# Patient Record
Sex: FEMALE | Race: WHITE | HISPANIC OR LATINO | Employment: UNEMPLOYED | URBAN - METROPOLITAN AREA
[De-identification: names, ages, dates, MRNs, and addresses within clinical notes are randomized per-mention and may not be internally consistent; named-entity substitution may affect disease eponyms.]

---

## 2019-12-27 PROCEDURE — 99284 EMERGENCY DEPT VISIT MOD MDM: CPT | Mod: 25

## 2019-12-27 PROCEDURE — 93010 ELECTROCARDIOGRAM REPORT: CPT | Mod: ,,, | Performed by: INTERNAL MEDICINE

## 2019-12-27 PROCEDURE — 96361 HYDRATE IV INFUSION ADD-ON: CPT

## 2019-12-27 PROCEDURE — 96374 THER/PROPH/DIAG INJ IV PUSH: CPT

## 2019-12-27 PROCEDURE — 93005 ELECTROCARDIOGRAM TRACING: CPT

## 2019-12-27 PROCEDURE — 99285 EMERGENCY DEPT VISIT HI MDM: CPT | Mod: ,,, | Performed by: EMERGENCY MEDICINE

## 2019-12-27 PROCEDURE — 93010 EKG 12-LEAD: ICD-10-PCS | Mod: ,,, | Performed by: INTERNAL MEDICINE

## 2019-12-27 PROCEDURE — 99285 PR EMERGENCY DEPT VISIT,LEVEL V: ICD-10-PCS | Mod: ,,, | Performed by: EMERGENCY MEDICINE

## 2019-12-28 ENCOUNTER — HOSPITAL ENCOUNTER (EMERGENCY)
Facility: HOSPITAL | Age: 29
Discharge: HOME | End: 2019-12-28
Attending: EMERGENCY MEDICINE

## 2019-12-28 VITALS
OXYGEN SATURATION: 100 % | BODY MASS INDEX: 21.26 KG/M2 | RESPIRATION RATE: 18 BRPM | WEIGHT: 120 LBS | SYSTOLIC BLOOD PRESSURE: 122 MMHG | DIASTOLIC BLOOD PRESSURE: 59 MMHG | HEIGHT: 63 IN | TEMPERATURE: 98 F | HEART RATE: 95 BPM

## 2019-12-28 DIAGNOSIS — F41.9 ANXIETY: Primary | ICD-10-CM

## 2019-12-28 DIAGNOSIS — G47.9 INABILITY TO SLEEP: ICD-10-CM

## 2019-12-28 DIAGNOSIS — R07.9 CHEST PAIN: ICD-10-CM

## 2019-12-28 LAB
ALBUMIN SERPL BCP-MCNC: 4.9 G/DL (ref 3.5–5.2)
ALP SERPL-CCNC: 81 U/L (ref 55–135)
ALT SERPL W/O P-5'-P-CCNC: 23 U/L (ref 10–44)
AMPHET+METHAMPHET UR QL: NEGATIVE
ANION GAP SERPL CALC-SCNC: 13 MMOL/L (ref 8–16)
AST SERPL-CCNC: 28 U/L (ref 10–40)
B-HCG UR QL: NEGATIVE
BARBITURATES UR QL SCN>200 NG/ML: NEGATIVE
BASOPHILS # BLD AUTO: 0.08 K/UL (ref 0–0.2)
BASOPHILS NFR BLD: 0.7 % (ref 0–1.9)
BENZODIAZ UR QL SCN>200 NG/ML: NEGATIVE
BILIRUB SERPL-MCNC: 0.9 MG/DL (ref 0.1–1)
BUN SERPL-MCNC: 4 MG/DL (ref 6–20)
BZE UR QL SCN: NEGATIVE
CALCIUM SERPL-MCNC: 10.1 MG/DL (ref 8.7–10.5)
CANNABINOIDS UR QL SCN: NORMAL
CHLORIDE SERPL-SCNC: 105 MMOL/L (ref 95–110)
CO2 SERPL-SCNC: 20 MMOL/L (ref 23–29)
CREAT SERPL-MCNC: 0.8 MG/DL (ref 0.5–1.4)
CREAT UR-MCNC: 190 MG/DL (ref 15–325)
CTP QC/QA: YES
DIFFERENTIAL METHOD: ABNORMAL
EOSINOPHIL # BLD AUTO: 0 K/UL (ref 0–0.5)
EOSINOPHIL NFR BLD: 0.1 % (ref 0–8)
ERYTHROCYTE [DISTWIDTH] IN BLOOD BY AUTOMATED COUNT: 11.9 % (ref 11.5–14.5)
EST. GFR  (AFRICAN AMERICAN): >60 ML/MIN/1.73 M^2
EST. GFR  (NON AFRICAN AMERICAN): >60 ML/MIN/1.73 M^2
GLUCOSE SERPL-MCNC: 121 MG/DL (ref 70–110)
HCT VFR BLD AUTO: 41.1 % (ref 37–48.5)
HGB BLD-MCNC: 14 G/DL (ref 12–16)
IMM GRANULOCYTES # BLD AUTO: 0.03 K/UL (ref 0–0.04)
IMM GRANULOCYTES NFR BLD AUTO: 0.2 % (ref 0–0.5)
LYMPHOCYTES # BLD AUTO: 2.2 K/UL (ref 1–4.8)
LYMPHOCYTES NFR BLD: 17.9 % (ref 18–48)
MCH RBC QN AUTO: 31.5 PG (ref 27–31)
MCHC RBC AUTO-ENTMCNC: 34.1 G/DL (ref 32–36)
MCV RBC AUTO: 92 FL (ref 82–98)
METHADONE UR QL SCN>300 NG/ML: NEGATIVE
MONOCYTES # BLD AUTO: 1.1 K/UL (ref 0.3–1)
MONOCYTES NFR BLD: 9.2 % (ref 4–15)
NEUTROPHILS # BLD AUTO: 8.7 K/UL (ref 1.8–7.7)
NEUTROPHILS NFR BLD: 71.9 % (ref 38–73)
NRBC BLD-RTO: 0 /100 WBC
OPIATES UR QL SCN: NEGATIVE
PCP UR QL SCN>25 NG/ML: NEGATIVE
PLATELET # BLD AUTO: 211 K/UL (ref 150–350)
PMV BLD AUTO: 13.2 FL (ref 9.2–12.9)
POTASSIUM SERPL-SCNC: 4.1 MMOL/L (ref 3.5–5.1)
PROT SERPL-MCNC: 8.2 G/DL (ref 6–8.4)
RBC # BLD AUTO: 4.45 M/UL (ref 4–5.4)
SODIUM SERPL-SCNC: 138 MMOL/L (ref 136–145)
TOXICOLOGY INFORMATION: NORMAL
WBC # BLD AUTO: 12.09 K/UL (ref 3.9–12.7)

## 2019-12-28 PROCEDURE — 85025 COMPLETE CBC W/AUTO DIFF WBC: CPT

## 2019-12-28 PROCEDURE — 81025 URINE PREGNANCY TEST: CPT | Performed by: EMERGENCY MEDICINE

## 2019-12-28 PROCEDURE — 80307 DRUG TEST PRSMV CHEM ANLYZR: CPT

## 2019-12-28 PROCEDURE — 63600175 PHARM REV CODE 636 W HCPCS: Performed by: EMERGENCY MEDICINE

## 2019-12-28 PROCEDURE — 80053 COMPREHEN METABOLIC PANEL: CPT

## 2019-12-28 RX ORDER — LORAZEPAM 2 MG/ML
0.5 INJECTION INTRAMUSCULAR
Status: COMPLETED | OUTPATIENT
Start: 2019-12-28 | End: 2019-12-28

## 2019-12-28 RX ADMIN — LORAZEPAM 0.5 MG: 2 INJECTION INTRAMUSCULAR; INTRAVENOUS at 01:12

## 2019-12-28 RX ADMIN — SODIUM CHLORIDE 1000 ML: 0.9 INJECTION, SOLUTION INTRAVENOUS at 04:12

## 2019-12-28 RX ADMIN — SODIUM CHLORIDE 1000 ML: 0.9 INJECTION, SOLUTION INTRAVENOUS at 01:12

## 2019-12-28 NOTE — DISCHARGE INSTRUCTIONS
Please follow-up outpatient within 1-2 weeks for re-evaluation.    If you develop any worsening pain, feel like going to herself, or hearing voices feel like you want hurt yourself feel like you have anxiety or depression that is out of control or for any other new, worsening or worrisome symptoms please return to the emergency department immediately.

## 2019-12-28 NOTE — ED NOTES
Pt came in as family concerned that she hasn't slept in a couple of days.  Pt denies lack of sleep.  Denies any chest pain or discomfort.

## 2019-12-29 NOTE — ED PROVIDER NOTES
"Encounter Date: 2019       History     Chief Complaint   Patient presents with    Panic Attack     family pt with "a lot of panic attacks."  has not slept in 3 days.  reports chest pain, vomiting. pt diaphoretic in triage.  denies SI, HI, hallucinations     HPI   29y F with who denies PMH comes in brought in by family for concern that she is not sleeping. Family also states that she is complaining of chest pain intermittently, they are worried she is having a panic attack. When patient is asked, she denies all these statements and states she feels fine and is able to sleep. She denies CP, SOB, dizziness, abdo pain. She denies being depressed.  Family states she stay up and cleans the house instead of sleeping.   She denies SI/HI/AH/VH. She denies drug use.   Family denies pt has had any suicidal gestures or comments.     Pt denies being abused or feeling in danger (ask separately without family present)  No Numbness, tingling, weakness, changes in vision, falls or trauma.   No cough, URI symptoms.     Review of patient's allergies indicates:   Allergen Reactions    Dilaudid [hydromorphone (bulk)]      Pt states she gets itchy everywhere     No past medical history on file.  Past Surgical History:   Procedure Laterality Date     SECTION, CLASSIC      GALLBLADDER SURGERY       Family History   Problem Relation Age of Onset    Cancer Neg Hx     Diabetes Neg Hx     Heart disease Neg Hx     Hypertension Neg Hx     Stroke Neg Hx      Social History     Tobacco Use    Smoking status: Current Some Day Smoker     Packs/day: 0.10     Years: 4.00     Pack years: 0.40    Smokeless tobacco: Never Used   Substance Use Topics    Alcohol use: Yes    Drug use: No     Review of Systems   Constitutional:  No Fever   Eyes: No Vision Changes  ENT/Mouth: No sore throat, No rhinorrhea  Cardiovascular:  No Chest Pain, No Palpitations  Respiratory:  No Cough, No SOB  Gastrointestinal:  No Nausea, No Vomiting, No " Diarrhea, No abdo pain.  Genitourinary:  No  pain, No dysuria   Musculoskeletal:  No Back Pain, No Neck Pain, No recent trauma.  Skin:  No skin Lesions  Neuro:  No Weakness, No Numbness, No Dizziness, No Headache        Physical Exam     Initial Vitals [12/27/19 2348]   BP Pulse Resp Temp SpO2   (!) 129/90 (!) 122 (!) 24 98.8 °F (37.1 °C) 99 %      MAP       --         Physical Exam   Physical Exam:  CONSTITUTIONAL: Well developed, well nourished, in no acute distress.  HENT: Normocephalic, atraumatic    EYES: Sclerae anicteric, pupils equal round reactive, EOMI, no nystagmus.  NECK: Supple, no thyroid enlargement  CARDIOVASCULAR: Tachycardic and regular, without any murmurs, gallops, rubs. No LE swelling or TTP.   RESPIRATORY: Speaking in full sentences. Breathing comfortably. Auscultation of the lungs revealed normal breath sounds b/l, no wheezing, no rales, no rhonchi.  ABDOMEN: Soft and nontender, no masses, no rebound or guarding   NEUROLOGIC: Alert, interacting normally. No facial droop. Normal FTN b/l, 5/5 strength b/l to upper and lower extremities.    MSK: Moving all four extremities.  Skin: There are excoriations to the upper chest, Warm and dry. Otherwise, no visible rash on exposed areas of skin.    Psych: Affect is flat, pt is intermittently no answering questions, kind of zones out, but is tracking this MD during these moments. No seizure like activity.       ED Course   Procedures  Labs Reviewed   CBC W/ AUTO DIFFERENTIAL - Abnormal; Notable for the following components:       Result Value    Mean Corpuscular Hemoglobin 31.5 (*)     MPV 13.2 (*)     Gran # (ANC) 8.7 (*)     Mono # 1.1 (*)     Lymph% 17.9 (*)     All other components within normal limits   COMPREHENSIVE METABOLIC PANEL - Abnormal; Notable for the following components:    CO2 20 (*)     Glucose 121 (*)     BUN, Bld 4 (*)     All other components within normal limits   DRUG SCREEN PANEL, URINE EMERGENCY   POCT URINE PREGNANCY     EKG  Readings: (Independently Interpreted)   Sinus tachycardia at 114, no ischemic changes.      ECG Results          EKG 12-lead (Final result)  Result time 12/28/19 11:10:45    Final result by Interface, Lab In [facility] (12/28/19 11:10:45)                 Narrative:    Test Reason : R07.9,    Vent. Rate : 114 BPM     Atrial Rate : 114 BPM     P-R Int : 126 ms          QRS Dur : 082 ms      QT Int : 322 ms       P-R-T Axes : 084 088 078 degrees     QTc Int : 443 ms    Sinus tachycardia  Otherwise normal ECG  No previous ECGs available  Confirmed by Stef Payton MD ([id number]) on 12/28/2019 11:10:38 AM    Referred By: [email or web address]   SELF           Confirmed By:Stef Payton MD                            Imaging Results    None          Medical Decision Making:   Initial Assessment:   29y F with no sig PMH comes in brought by family for insomnia for 3 days and reported chest discomfort, family concerned pt is having panic attacks.  Exam is non focal, EKG is as noted. Lung are clear and, other than tachycardia,  there is no evidence on exam of cardiopulmonary disease. Abdomen is benign. Neuro exam is non-focal    Tachy, but otherwise no PE risk factors. Not consistent with ACS.     Affect is flat and demeanor of patient points to likely psych vs substance induced disturbance.    Pt denies abuse.   Is not psychotic, and despite above, is linear and appropriate.     Basic labs undertaken are neg. UPT neg, Utox + for THC.     CT brain considered, however pt is not altered, just has flat affect and avoidant demeanor. Given benign neuro exam, not consistent with dangerous IC pathology. Risk of CT outweigh benefits.    Given IVF to improve HR. Given very small dose of Ativan for anxiolysis to see if this helps relax patient. Pt was able to sleep for several hours in the emergency department. HR normalized, exam continued to be benign.    Recommended PO benadry for further sleep aid and outpatient follow up. Informed pt and family that  she may need psych follow up if she continues to have similar symptoms. ED return instructions.     Findings of ED work up explained to patient. Patient agrees with discharge plan and verbalizes understanding of return precautions.     MDM Complexity Points:   Problem Points:  1.New problem, with no additional ED work-up planned (maximum of 1) - insomnia, chest pain, report anxiety.    Data Points:  Review or order clinical lab tests, Review or order medicine test (PFTs, EKG, cardiac echo or catheterization), Independent review of image, tracing, or specimen, Decision to obtain old records (in the EHR) and Obtaining history from Someone else other than the patient. - part of hx obtained from family.                                        Clinical Impression:       ICD-10-CM ICD-9-CM   1. Anxiety F41.9 300.00   2. Chest pain R07.9 786.50   3. Inability to sleep G47.9 780.50                             Hitesh Glass MD  12/28/19 1925       Hitesh Glass MD  12/28/19 1925

## 2020-01-01 ENCOUNTER — HOSPITAL ENCOUNTER (EMERGENCY)
Facility: HOSPITAL | Age: 30
Discharge: HOME | End: 2020-01-01
Attending: EMERGENCY MEDICINE

## 2020-01-01 VITALS
BODY MASS INDEX: 20.24 KG/M2 | RESPIRATION RATE: 18 BRPM | HEART RATE: 93 BPM | HEIGHT: 62 IN | OXYGEN SATURATION: 99 % | SYSTOLIC BLOOD PRESSURE: 146 MMHG | DIASTOLIC BLOOD PRESSURE: 82 MMHG | WEIGHT: 110 LBS | TEMPERATURE: 98 F

## 2020-01-01 DIAGNOSIS — R68.89 MULTIPLE COMPLAINTS: ICD-10-CM

## 2020-01-01 DIAGNOSIS — F41.9 ANXIETY: Primary | ICD-10-CM

## 2020-01-01 LAB
B-HCG UR QL: NEGATIVE
CTP QC/QA: YES

## 2020-01-01 PROCEDURE — 99283 EMERGENCY DEPT VISIT LOW MDM: CPT | Mod: 25

## 2020-01-01 PROCEDURE — 93010 EKG 12-LEAD: ICD-10-PCS | Mod: ,,, | Performed by: INTERNAL MEDICINE

## 2020-01-01 PROCEDURE — 93005 ELECTROCARDIOGRAM TRACING: CPT

## 2020-01-01 PROCEDURE — 93010 ELECTROCARDIOGRAM REPORT: CPT | Mod: ,,, | Performed by: INTERNAL MEDICINE

## 2020-01-01 PROCEDURE — 81025 URINE PREGNANCY TEST: CPT | Performed by: PHYSICIAN ASSISTANT

## 2020-01-01 PROCEDURE — 99285 PR EMERGENCY DEPT VISIT,LEVEL V: ICD-10-PCS | Mod: ,,, | Performed by: PHYSICIAN ASSISTANT

## 2020-01-01 PROCEDURE — 99285 EMERGENCY DEPT VISIT HI MDM: CPT | Mod: ,,, | Performed by: PHYSICIAN ASSISTANT

## 2020-01-01 PROCEDURE — 25000003 PHARM REV CODE 250: Performed by: PHYSICIAN ASSISTANT

## 2020-01-01 RX ORDER — HYDROXYZINE PAMOATE 25 MG/1
50 CAPSULE ORAL
Status: COMPLETED | OUTPATIENT
Start: 2020-01-01 | End: 2020-01-01

## 2020-01-01 RX ORDER — HYDROXYZINE HYDROCHLORIDE 25 MG/1
25 TABLET, FILM COATED ORAL 3 TIMES DAILY PRN
Qty: 30 TABLET | Refills: 0 | Status: SHIPPED | OUTPATIENT
Start: 2020-01-01 | End: 2020-06-08

## 2020-01-01 RX ORDER — IBUPROFEN 600 MG/1
600 TABLET ORAL
Status: COMPLETED | OUTPATIENT
Start: 2020-01-01 | End: 2020-01-01

## 2020-01-01 RX ADMIN — HYDROXYZINE PAMOATE 50 MG: 25 CAPSULE ORAL at 05:01

## 2020-01-01 RX ADMIN — IBUPROFEN 600 MG: 600 TABLET, FILM COATED ORAL at 05:01

## 2020-01-01 NOTE — ED NOTES
The patient came to the ER today with c/o feeling palpitations, left temporal headache. She feels as though her head gets hot and that part of her head is swollen. Sometimes she will try to sleep and her heart will start beating really fast and it wakes her up. The patient was seen recently in the er for anxiety. The patient has not been sleeping well. The patient is Azerbaijani speaking. Pt is accompanied by her cousin.

## 2020-01-01 NOTE — ED NOTES
LOC: The patient is awake, alert and aware of environment with an appropriate affect, the patient is oriented x 3.  APPEARANCE: Patient resting comfortably and in no acute distress, patient is clean and well groomed.  SKIN: The skin is warm and dry, color consistent with ethnicity, patient has normal skin turgor.  MUSKULOSKELETAL: Patient moving all extremities well, no obvious swelling or deformities noted.  RESPIRATORY: Airway is open and patent, respirations are spontaneous, patient has a normal effort and rate, no accessory muscle use noted.   CARDIAC: Patient has a normal rate and rhythm  NEUROLOGIC: Eyes open spontaneously, behavior appropriate to situation, follows commands

## 2020-01-01 NOTE — PROVIDER PROGRESS NOTES - EMERGENCY DEPT.
Encounter Date: 1/1/2020    ED Physician Progress Notes         EKG - STEMI Decision  Initial Reading: No STEMI present.

## 2020-01-02 NOTE — DISCHARGE INSTRUCTIONS
I suspect that your symptoms are due to anxiety. I am prescribing a medicine for anxiety, however best treatment for anxiety is therapy.  Please schedule an appointment with a primary care doctor or psychiatrist.  I have provided the number for [facility] where you can be seen without insurance.    If you start to have severe, worsening chest pain, shortness of breath or any new symptoms please return to the emergency department.    Sospecho que tus síntomas se deben a la ansiedad. Estoy prescribiendo un medicamento para la ansiedad, sin embargo, el mejor tratamiento para la ansiedad es la terapia. Programe keaton jannette con un médico de atención primaria o psiquiatra. He proporcionado el número de [facility] donde puede ser atendido sin seguro.    Si comienza a tener dolor de pecho severo, que empeora, falta de aliento o cualquier síntoma nuevo, regrese al departamento de emergencias.

## 2020-06-08 ENCOUNTER — LAB VISIT (OUTPATIENT)
Dept: LAB | Facility: OTHER | Age: 30
End: 2020-06-08
Attending: NURSE PRACTITIONER
Payer: MEDICAID

## 2020-06-08 ENCOUNTER — OFFICE VISIT (OUTPATIENT)
Dept: OBSTETRICS AND GYNECOLOGY | Facility: CLINIC | Age: 30
End: 2020-06-08
Payer: MEDICAID

## 2020-06-08 VITALS
BODY MASS INDEX: 23.22 KG/M2 | SYSTOLIC BLOOD PRESSURE: 98 MMHG | HEIGHT: 61 IN | WEIGHT: 123 LBS | DIASTOLIC BLOOD PRESSURE: 60 MMHG

## 2020-06-08 DIAGNOSIS — N91.2 AMENORRHEA: ICD-10-CM

## 2020-06-08 DIAGNOSIS — N91.2 AMENORRHEA: Primary | ICD-10-CM

## 2020-06-08 LAB
ABO + RH BLD: NORMAL
ANION GAP SERPL CALC-SCNC: 11 MMOL/L (ref 8–16)
B-HCG UR QL: POSITIVE
BASOPHILS # BLD AUTO: 0.04 K/UL (ref 0–0.2)
BASOPHILS NFR BLD: 0.5 % (ref 0–1.9)
BLD GP AB SCN CELLS X3 SERPL QL: NORMAL
BUN SERPL-MCNC: 6 MG/DL (ref 6–20)
CALCIUM SERPL-MCNC: 9.5 MG/DL (ref 8.7–10.5)
CHLORIDE SERPL-SCNC: 103 MMOL/L (ref 95–110)
CO2 SERPL-SCNC: 22 MMOL/L (ref 23–29)
CREAT SERPL-MCNC: 0.6 MG/DL (ref 0.5–1.4)
CTP QC/QA: YES
DIFFERENTIAL METHOD: ABNORMAL
EOSINOPHIL # BLD AUTO: 0 K/UL (ref 0–0.5)
EOSINOPHIL NFR BLD: 0.4 % (ref 0–8)
ERYTHROCYTE [DISTWIDTH] IN BLOOD BY AUTOMATED COUNT: 12.4 % (ref 11.5–14.5)
EST. GFR  (AFRICAN AMERICAN): >60 ML/MIN/1.73 M^2
EST. GFR  (NON AFRICAN AMERICAN): >60 ML/MIN/1.73 M^2
GLUCOSE SERPL-MCNC: 81 MG/DL (ref 70–110)
HCT VFR BLD AUTO: 35.4 % (ref 37–48.5)
HGB BLD-MCNC: 12 G/DL (ref 12–16)
IMM GRANULOCYTES # BLD AUTO: 0.01 K/UL (ref 0–0.04)
IMM GRANULOCYTES NFR BLD AUTO: 0.1 % (ref 0–0.5)
LYMPHOCYTES # BLD AUTO: 1.8 K/UL (ref 1–4.8)
LYMPHOCYTES NFR BLD: 23.9 % (ref 18–48)
MCH RBC QN AUTO: 30.9 PG (ref 27–31)
MCHC RBC AUTO-ENTMCNC: 33.9 G/DL (ref 32–36)
MCV RBC AUTO: 91 FL (ref 82–98)
MONOCYTES # BLD AUTO: 0.6 K/UL (ref 0.3–1)
MONOCYTES NFR BLD: 7.4 % (ref 4–15)
NEUTROPHILS # BLD AUTO: 5.2 K/UL (ref 1.8–7.7)
NEUTROPHILS NFR BLD: 67.7 % (ref 38–73)
NRBC BLD-RTO: 0 /100 WBC
PLATELET # BLD AUTO: 184 K/UL (ref 150–350)
PMV BLD AUTO: 12.6 FL (ref 9.2–12.9)
POTASSIUM SERPL-SCNC: 3.8 MMOL/L (ref 3.5–5.1)
RBC # BLD AUTO: 3.88 M/UL (ref 4–5.4)
SODIUM SERPL-SCNC: 136 MMOL/L (ref 136–145)
WBC # BLD AUTO: 7.61 K/UL (ref 3.9–12.7)

## 2020-06-08 PROCEDURE — 87624 HPV HI-RISK TYP POOLED RSLT: CPT

## 2020-06-08 PROCEDURE — 99204 OFFICE O/P NEW MOD 45 MIN: CPT | Mod: TH,S$PBB,, | Performed by: NURSE PRACTITIONER

## 2020-06-08 PROCEDURE — 86901 BLOOD TYPING SEROLOGIC RH(D): CPT

## 2020-06-08 PROCEDURE — 99999 PR PBB SHADOW E&M-EST. PATIENT-LVL III: CPT | Mod: PBBFAC,,, | Performed by: NURSE PRACTITIONER

## 2020-06-08 PROCEDURE — 87491 CHLMYD TRACH DNA AMP PROBE: CPT

## 2020-06-08 PROCEDURE — 81025 URINE PREGNANCY TEST: CPT | Mod: PBBFAC | Performed by: NURSE PRACTITIONER

## 2020-06-08 PROCEDURE — 36415 COLL VENOUS BLD VENIPUNCTURE: CPT

## 2020-06-08 PROCEDURE — 86592 SYPHILIS TEST NON-TREP QUAL: CPT

## 2020-06-08 PROCEDURE — 87086 URINE CULTURE/COLONY COUNT: CPT

## 2020-06-08 PROCEDURE — 86703 HIV-1/HIV-2 1 RESULT ANTBDY: CPT

## 2020-06-08 PROCEDURE — 99204 PR OFFICE/OUTPT VISIT, NEW, LEVL IV, 45-59 MIN: ICD-10-PCS | Mod: TH,S$PBB,, | Performed by: NURSE PRACTITIONER

## 2020-06-08 PROCEDURE — 85025 COMPLETE CBC W/AUTO DIFF WBC: CPT

## 2020-06-08 PROCEDURE — 80048 BASIC METABOLIC PNL TOTAL CA: CPT

## 2020-06-08 PROCEDURE — 86762 RUBELLA ANTIBODY: CPT

## 2020-06-08 PROCEDURE — 88175 CYTOPATH C/V AUTO FLUID REDO: CPT

## 2020-06-08 PROCEDURE — 87340 HEPATITIS B SURFACE AG IA: CPT

## 2020-06-08 PROCEDURE — 99999 PR PBB SHADOW E&M-EST. PATIENT-LVL III: ICD-10-PCS | Mod: PBBFAC,,, | Performed by: NURSE PRACTITIONER

## 2020-06-08 PROCEDURE — 99213 OFFICE O/P EST LOW 20 MIN: CPT | Mod: PBBFAC,25 | Performed by: NURSE PRACTITIONER

## 2020-06-08 PROCEDURE — 83020 HEMOGLOBIN ELECTROPHORESIS: CPT

## 2020-06-08 NOTE — PROGRESS NOTES
"CC: Positive Pregnancy Test    HISTORY OF PRESENT ILLNESS:    Sena Daniel is a 29 y.o. female, ,  Presents today for a routine exam complaining of amenorrhea and positive home urine pregnancy test.  LMP =3/3/2020  Pt reports menses were normal occuring every 30 days prior to this.  She is not currently on any contraception. She reports first baby was born at [facility]. She had a  for breech position. She denies any complications with pregnancy or delivery. She denies any medical problems. Only surgery was .     Denies nausea. Denies breast tenderness. Denies pelvic pain.    LMP: 3/3/2020  EGA: 13w6d  EDC: 2020        ROS:  GENERAL: No weight changes. No swelling. No fatigue. No fever.  CARDIOVASCULAR: No chest pain. No shortness of breath. No leg cramps.   NEUROLOGICAL: No headaches. No vision changes.  BREASTS: No pain. No lumps. No discharge.  ABDOMEN: No pain. No diarrhea. No constipation.  REPRODUCTIVE: No abnormal bleeding.   VULVA: No pain. No lesions. No itching.  VAGINA: No relaxation. No itching. No odor. No discharge. No lesions.  URINARY: No incontinence. No nocturia. No frequency. No dysuria.    MEDICATIONS AND ALLERGIES:  Reviewed        COMPREHENSIVE GYN HISTORY:  PAP History: Denies abnormal Paps.  Infection History: Denies STDs. Denies PID.  Benign History: Denies uterine fibroids. Denies ovarian cysts. Denies endometriosis. Denies other conditions.  Cancer History: Denies cervical cancer. Denies uterine cancer or hyperplasia. Denies ovarian cancer. Denies vulvar cancer or pre-cancer. Denies vaginal cancer or pre-cancer. Denies breast cancer. Denies colon cancer.  Sexual Activity History: Reports currently being sexually active  Menstrual History: None.  Contraception: None    BP 98/60   Ht 5' 1" (1.549 m)   Wt 55.8 kg (123 lb 0.3 oz)   BMI 23.24 kg/m²     PE:  AFFECT: Calm, alert and oriented X 3. Interactive during exam  GENERAL: Appears well-nourished, " well-developed, in no acute distress.  HEAD: Normocephalic, atruamatic  TEETH: Good dentition.  THYROID: No thyromegally   BREASTS: No masses, skin changes, nipple discharge or adenopathy bilaterally.  SKIN: Normal for race, warm, & dry. No lesions or rashes.  LUNGS: Easy and unlabored, clear to auscultation bilaterally.  HEART: Regular rate and rhythm   ABDOMEN: Soft and nontender without masses or organomegally.  VULVA: No lesions, masses or tenderness.  VAGINA: Moist and well rugated without lesions or discharge.  CERVIX: Moist and pink without lesions, discharge or tenderness.      UTERUS SIZE: 14 week size, nontender and without masses.  ADNEXA: No masses or tenderness.  ESTIMATE OF PELVIC CAPACITY: Adequate  EXTREMITIES: No cyanosis, clubbing or edema. No calf tenderness.  LYMPH NODES: No axillary or inguinal adenopathy.    PROCEDURES:  UPT Positive  GCCT  Pap      ASSESSMENT/PLAN:  Amenorrhea  Positive urine pregnancy test (ZEKE: 2020, EGA: 13w6d based on LMP)    -  Routine prenatal care        1st TRIMESTER COUNSELING: Discussed all, booklet provided:  Common complaints of pregnancy  HIV and other routine prenatal tests including  genetic screening  Risk factors identified by prenatal history  Oriented to practice - discussed anticipated course of prenatal care & indications for Ultrasound  Childbirth classes/Hospital facilities   Nutrition and weight gain counseling  Toxoplasmosis precautions (Cats/Raw Meat)  Sexual activity and exercise  Environmental/Work hazards  Travel  Tobacco (Ask, Advise, Assess, Assist, and Arrange), as well as alcohol and drug use  Use of any medications (Including supplements, Vitamins, Herbs, or OTC Drugs)  Domestic violence  Seat belt use      TERATOLOGY COUNSELING:   Discussed indications and options for aneuploidy screening - pamphlets given    -  Pt thinking about quad screen    IOB labs ordered  Dating scan ordered  Anatomy scan ordered    FOLLOW-UP in 4 weeks  with [facility]    Eloisa Hartman, NP    OB/GYN

## 2020-06-09 LAB
HBV SURFACE AG SERPL QL IA: NEGATIVE
HIV 1+2 AB+HIV1 P24 AG SERPL QL IA: NEGATIVE
RPR SER QL: NORMAL
RUBV IGG SER-ACNC: 43.1 IU/ML
RUBV IGG SER-IMP: REACTIVE

## 2020-06-10 LAB
BACTERIA UR CULT: NO GROWTH
C TRACH DNA SPEC QL NAA+PROBE: NOT DETECTED
N GONORRHOEA DNA SPEC QL NAA+PROBE: NOT DETECTED

## 2020-06-11 LAB
HGB A2 MFR BLD HPLC: 2.6 % (ref 2.2–3.2)
HGB FRACT BLD ELPH-IMP: NORMAL
HGB FRACT BLD ELPH-IMP: NORMAL

## 2020-06-12 ENCOUNTER — PROCEDURE VISIT (OUTPATIENT)
Dept: OBSTETRICS AND GYNECOLOGY | Facility: CLINIC | Age: 30
End: 2020-06-12
Payer: MEDICAID

## 2020-06-12 ENCOUNTER — TELEPHONE (OUTPATIENT)
Dept: EMERGENCY MEDICINE | Facility: OTHER | Age: 30
End: 2020-06-12

## 2020-06-12 DIAGNOSIS — N91.2 AMENORRHEA: ICD-10-CM

## 2020-06-12 LAB
FINAL PATHOLOGIC DIAGNOSIS: NORMAL
HPV HR 12 DNA SPEC QL NAA+PROBE: NEGATIVE
HPV16 AG SPEC QL: NEGATIVE
HPV18 DNA SPEC QL NAA+PROBE: NEGATIVE
Lab: NORMAL

## 2020-06-12 PROCEDURE — 76816 OB US FOLLOW-UP PER FETUS: CPT | Mod: PBBFAC | Performed by: OBSTETRICS & GYNECOLOGY

## 2020-06-12 PROCEDURE — 76816 PR  US,PREGNANT UTERUS,F/U,TRANSABD APP: ICD-10-PCS | Mod: 26,S$PBB,, | Performed by: OBSTETRICS & GYNECOLOGY

## 2020-06-12 PROCEDURE — 76816 OB US FOLLOW-UP PER FETUS: CPT | Mod: 26,S$PBB,, | Performed by: OBSTETRICS & GYNECOLOGY

## 2020-06-12 NOTE — TELEPHONE ENCOUNTER
Please call pt and inform her that her pap smear was normal and her HPV testing was negative.     Thanks

## 2020-06-29 ENCOUNTER — HOSPITAL ENCOUNTER (EMERGENCY)
Facility: OTHER | Age: 30
Discharge: HOME | End: 2020-06-29
Attending: OBSTETRICS & GYNECOLOGY
Payer: MEDICAID

## 2020-06-29 VITALS
DIASTOLIC BLOOD PRESSURE: 72 MMHG | TEMPERATURE: 98 F | OXYGEN SATURATION: 98 % | RESPIRATION RATE: 17 BRPM | HEART RATE: 97 BPM | SYSTOLIC BLOOD PRESSURE: 122 MMHG

## 2020-06-29 DIAGNOSIS — K59.00 CONSTIPATION, UNSPECIFIED CONSTIPATION TYPE: Primary | ICD-10-CM

## 2020-06-29 DIAGNOSIS — Z3A.18 18 WEEKS GESTATION OF PREGNANCY: ICD-10-CM

## 2020-06-29 PROBLEM — I10 CHRONIC HYPERTENSION: Status: ACTIVE | Noted: 2020-06-29

## 2020-06-29 PROCEDURE — 99283 PR EMERGENCY DEPT VISIT,LEVEL III: ICD-10-PCS | Mod: ,,, | Performed by: OBSTETRICS & GYNECOLOGY

## 2020-06-29 PROCEDURE — 99283 EMERGENCY DEPT VISIT LOW MDM: CPT | Mod: ,,, | Performed by: OBSTETRICS & GYNECOLOGY

## 2020-06-29 PROCEDURE — 99283 EMERGENCY DEPT VISIT LOW MDM: CPT

## 2020-06-29 RX ORDER — ASPIRIN 81 MG/1
81 TABLET ORAL DAILY
Qty: 30 TABLET | Refills: 8 | Status: SHIPPED | OUTPATIENT
Start: 2020-06-29 | End: 2021-06-29

## 2020-06-29 NOTE — ED NOTES
Pt c/o rt sided lower abdominal pain  8/10 constant denies vb or lof.   Denies s/s of uti   States last bm x 2 days.  Urine dip negative   Dr estevez notified

## 2020-06-29 NOTE — ED PROVIDER NOTES
Encounter Date: 2020       History     Chief Complaint   Patient presents with    Abdominal Pain     RLQ pain     Sena Daniel is a 29 y.o. [id number] at 18w2d presents complaining of right sided lower abdominal pain. The pain is sharp, constant, and began this morning. She has not tried any medications for relief. She denies any fever, chills, contractions, cramping, vaginal bleeding or discharge. She has been having BM every 2-3 days, which is abnormal for her. She is tolerating PO. She has not tried any stool softeners and endorses adequate water intake.     This IUP is complicated by hx cholelithiasis.  Patient denies contractions, denies vaginal bleeding, denies LOF.   Fetal Movement: normal.      Review of patient's allergies indicates:   Allergen Reactions    Dilaudid [hydromorphone (bulk)]      Pt states she gets itchy everywhere     Past Medical History:   Diagnosis Date    Chronic hypertension 2020     Past Surgical History:   Procedure Laterality Date     SECTION, CLASSIC      GALLBLADDER SURGERY       Family History   Problem Relation Age of Onset    Cancer Neg Hx     Diabetes Neg Hx     Heart disease Neg Hx     Hypertension Neg Hx     Stroke Neg Hx      Social History     Tobacco Use    Smoking status: Current Some Day Smoker     Packs/day: 0.10     Years: 4.00     Pack years: 0.40    Smokeless tobacco: Never Used   Substance Use Topics    Alcohol use: Yes    Drug use: No     Review of Systems   Constitutional: Negative for chills and fever.   HENT: Negative for sore throat.    Eyes: Negative for visual disturbance.   Respiratory: Negative for cough and shortness of breath.    Cardiovascular: Negative for chest pain and palpitations.   Gastrointestinal: Positive for abdominal pain and constipation. Negative for diarrhea, nausea and vomiting.   Genitourinary: Negative for difficulty urinating, flank pain, pelvic pain, vaginal bleeding and vaginal discharge.    Musculoskeletal: Negative for myalgias.   Skin: Negative for color change.   Neurological: Negative for dizziness, light-headedness and headaches.   Psychiatric/Behavioral: Negative for confusion.   All other systems reviewed and are negative.      Physical Exam     Initial Vitals [06/29/20 1630]   BP Pulse Resp Temp SpO2   120/73 89 17 98.2 °F (36.8 °C) 99 %      MAP       --         Physical Exam    Vitals reviewed.  Constitutional: She appears well-developed and well-nourished. No distress.   HENT:   Head: Normocephalic and atraumatic.   Eyes: EOM are normal.   Neck: Normal range of motion.   Cardiovascular: Normal rate.   Pulmonary/Chest: No respiratory distress. She has no wheezes.   Abdominal: Soft. She exhibits no distension and no mass. There is abdominal tenderness (mild RLQ). There is no rebound and no guarding.   Genitourinary:    Uterus normal.     Musculoskeletal: Normal range of motion. No tenderness or edema.   Neurological: She is alert and oriented to person, place, and time.   Skin: Skin is warm, dry and intact. No rash noted.   Psychiatric: She has a normal mood and affect. Her speech is normal and behavior is normal.         ED Course   Procedures  Labs Reviewed - No data to display       Imaging Results    None          Medical Decision Making:   ED Management:  VSS; afebrile  POCT urine normal  FHT confirmed  Exam unremarkable; no rebound, guarding.  Based off of history, likely normal constipation  Advised pt to increase water intake, start taking daily colace until sx resolve  Discussed with staff              Attending Attestation:   Physician Attestation Statement for Resident:  As the supervising MD   Physician Attestation Statement: I have personally seen and examined this patient.   I agree with the above history. -:   As the supervising MD I agree with the above PE.    As the supervising MD I agree with the above treatment, course, plan, and disposition.   -: Patient evaluated and  found to be stable, agree with resident's assessment and plan.  I was personally present during the critical portions of the procedure(s) performed by the resident and was immediately available in the ED to provide services and assistance as needed during the entire procedure.  I have reviewed the following: old records at this facility.                                  Clinical Impression:       ICD-10-CM ICD-9-CM   1. Constipation, unspecified constipation type  K59.00 564.00   2. 18 weeks gestation of pregnancy  Z3A.18 V22.2         Disposition:   Disposition: Discharged  Condition: Stable     ED Disposition Condition    Discharge Stable        ED Prescriptions     Medication Sig Dispense Start Date End Date Auth. Provider    aspirin (ECOTRIN) 81 MG EC tablet Take 1 tablet (81 mg total) by mouth once daily. 30 tablet 6/29/2020 6/29/2021 Cait Wilkins MD        Follow-up Information    None                     ALICE Zhao MD  OBGYN PGY2                Gurmeet Zhao MD  Resident  06/29/20 1703       Ban Armijo MD  06/30/20 2497

## 2020-06-29 NOTE — DISCHARGE INSTRUCTIONS
Take colace daily. If not effective after 3-5 days, start taking milk of magnesia.   Call clinic [phone] or L & D after hours at [phone] for vaginal bleeding, leakage of fluids, contractions 4-5 in one hour, headache not relieved by Tylenol, blurry vision, or temp of 100.4 or greater.  Keep next clinic appointment

## 2020-07-06 ENCOUNTER — INITIAL PRENATAL (OUTPATIENT)
Dept: OBSTETRICS AND GYNECOLOGY | Facility: CLINIC | Age: 30
End: 2020-07-06
Payer: MEDICAID

## 2020-07-06 VITALS
DIASTOLIC BLOOD PRESSURE: 72 MMHG | SYSTOLIC BLOOD PRESSURE: 106 MMHG | WEIGHT: 121.94 LBS | BODY MASS INDEX: 23.04 KG/M2

## 2020-07-06 DIAGNOSIS — O34.219 MATERNAL CARE FOR SCAR FROM PREVIOUS CESAREAN DELIVERY, UNSPECIFIED PRIOR CESAREAN DELIVERY TYPE: ICD-10-CM

## 2020-07-06 DIAGNOSIS — Z34.82 ENCOUNTER FOR SUPERVISION OF OTHER NORMAL PREGNANCY IN SECOND TRIMESTER: Primary | ICD-10-CM

## 2020-07-06 PROBLEM — Z98.891 PREVIOUS CESAREAN SECTION: Status: ACTIVE | Noted: 2019-01-04

## 2020-07-06 PROCEDURE — 99213 OFFICE O/P EST LOW 20 MIN: CPT | Mod: TH,S$PBB,, | Performed by: OBSTETRICS & GYNECOLOGY

## 2020-07-06 PROCEDURE — 99999 PR PBB SHADOW E&M-EST. PATIENT-LVL II: ICD-10-PCS | Mod: PBBFAC,,, | Performed by: OBSTETRICS & GYNECOLOGY

## 2020-07-06 PROCEDURE — 99212 OFFICE O/P EST SF 10 MIN: CPT | Mod: PBBFAC,TH,PO | Performed by: OBSTETRICS & GYNECOLOGY

## 2020-07-06 PROCEDURE — 99999 PR PBB SHADOW E&M-EST. PATIENT-LVL II: CPT | Mod: PBBFAC,,, | Performed by: OBSTETRICS & GYNECOLOGY

## 2020-07-06 PROCEDURE — 99213 PR OFFICE/OUTPT VISIT, EST, LEVL III, 20-29 MIN: ICD-10-PCS | Mod: TH,S$PBB,, | Performed by: OBSTETRICS & GYNECOLOGY

## 2020-07-06 NOTE — PROGRESS NOTES
"Reason for visit: Initial Prenatal Visit    HPI:   29 y.o., at 19w2d by Estimated Date of Delivery: 20. Patient here to establish OB care. Has had initial OB with NP.   Patient is a , last pregnancy in . Delivered by CS (EMR says 2/2 breech, patient reports due to "head wouldn't come down.") Otherwise uncomplicated pregnancy.   Patient denies any significant medical history, including DM and HTN. No significant FH.     ROS:  OBSTETRICS  - Contractions: No  - Bleeding: No  - Loss of fluid: No  - Fetal movement: Not yet  GASTRO  - Nausea: No  - Vomiting: No  NEURO  - Headache: No    Past medical, surgical, social, and family, and history: Reviewed and updated in EMR.  Medications: Reviewed and updated in EMR.  Allergies: Patient has no active allergies.     OB History    Para Term  AB Living   2 1 1     1   SAB TAB Ectopic Multiple Live Births           1      # Outcome Date GA Lbr Anthony/2nd Weight Sex Delivery Anes PTL Lv   2 Current            1 Term 07/10/09    M CS-Unspec   KVNG     Pregnancy dating, labs, ultrasound reports, prenatal testing, and problem list: Reviewed and updated in EMR.     Problems (from 20 to present)     No problems associated with this episode.        Vitals: /72   Wt 55.3 kg (121 lb 14.6 oz)   LMP 2020 (Approximate)   BMI 23.04 kg/m²     Physical exam:  GENERAL: No acute distress  HEENT: Normocephalic  NEURO: Alert and oriented x3  PSYCH: Normal mood and affect  PULMONARY: Non-labored respiration; no tachypnea  ABD: Soft, gravid, nontender; no hernia or hepatosplenomegaly    Assessment and Plan:    Encounter for supervision of low-risk pregnancy in second trimester  - Pap NILM/HPV neg  - 1T labs reviewed; Hep C to be drawn at next lab draw  - US dating at 15w6d done, ZEKE based on US not consistent with LMP  - Anatomy scan scheduled for   - Continue PNV  - Discuss PP contraception at next visit     H/O  section  - Will " discuss desired mode of delivery closer to term  - Scar is pfannenstiel  - If  desired will attempt to get op note     labor precautions given  Follow-up: 4 weeks    Earle Lyon M.D.  Obstetrics & Gynecology  PGY-3

## 2020-07-14 ENCOUNTER — PROCEDURE VISIT (OUTPATIENT)
Dept: MATERNAL FETAL MEDICINE | Facility: CLINIC | Age: 30
End: 2020-07-14
Payer: MEDICAID

## 2020-07-14 DIAGNOSIS — N91.2 AMENORRHEA: ICD-10-CM

## 2020-07-14 DIAGNOSIS — Z36.89 ENCOUNTER FOR FETAL ANATOMIC SURVEY: ICD-10-CM

## 2020-07-14 PROCEDURE — 76805 OB US >/= 14 WKS SNGL FETUS: CPT | Mod: PBBFAC | Performed by: OBSTETRICS & GYNECOLOGY

## 2020-07-14 PROCEDURE — 76805 OB US >/= 14 WKS SNGL FETUS: CPT | Mod: 26,S$PBB,, | Performed by: OBSTETRICS & GYNECOLOGY

## 2020-07-14 PROCEDURE — 76805 PR US, OB 14+WKS, TRANSABD, SINGLE GESTATION: ICD-10-PCS | Mod: 26,S$PBB,, | Performed by: OBSTETRICS & GYNECOLOGY

## 2020-07-30 ENCOUNTER — TELEPHONE (OUTPATIENT)
Dept: OBSTETRICS AND GYNECOLOGY | Facility: CLINIC | Age: 30
End: 2020-07-30

## 2020-07-30 PROBLEM — O34.219 MATERNAL CARE FOR SCAR FROM PREVIOUS CESAREAN DELIVERY: Status: ACTIVE | Noted: 2019-01-04

## 2020-07-30 PROBLEM — O34.211 MATERNAL CARE DUE TO LOW TRANSVERSE UTERINE SCAR FROM PREVIOUS CESAREAN DELIVERY: Status: ACTIVE | Noted: 2019-01-04

## 2020-07-30 PROBLEM — Z34.80 SUPERVISION OF OTHER NORMAL PREGNANCY, ANTEPARTUM: Status: ACTIVE | Noted: 2020-07-30

## 2020-08-11 ENCOUNTER — ROUTINE PRENATAL (OUTPATIENT)
Dept: OBSTETRICS AND GYNECOLOGY | Facility: CLINIC | Age: 30
End: 2020-08-11
Payer: MEDICAID

## 2020-08-11 VITALS
BODY MASS INDEX: 24.58 KG/M2 | SYSTOLIC BLOOD PRESSURE: 100 MMHG | DIASTOLIC BLOOD PRESSURE: 80 MMHG | WEIGHT: 130.06 LBS

## 2020-08-11 DIAGNOSIS — Z34.83 ENCOUNTER FOR SUPERVISION OF OTHER NORMAL PREGNANCY IN THIRD TRIMESTER: Primary | ICD-10-CM

## 2020-08-11 PROCEDURE — 99213 OFFICE O/P EST LOW 20 MIN: CPT | Mod: TH,S$PBB,, | Performed by: ADVANCED PRACTICE MIDWIFE

## 2020-08-11 PROCEDURE — 99213 PR OFFICE/OUTPT VISIT, EST, LEVL III, 20-29 MIN: ICD-10-PCS | Mod: TH,S$PBB,, | Performed by: ADVANCED PRACTICE MIDWIFE

## 2020-08-11 PROCEDURE — 99999 PR PBB SHADOW E&M-EST. PATIENT-LVL II: CPT | Mod: PBBFAC,,, | Performed by: ADVANCED PRACTICE MIDWIFE

## 2020-08-11 PROCEDURE — 99212 OFFICE O/P EST SF 10 MIN: CPT | Mod: PBBFAC,TH,PO | Performed by: ADVANCED PRACTICE MIDWIFE

## 2020-08-11 PROCEDURE — 99999 PR PBB SHADOW E&M-EST. PATIENT-LVL II: ICD-10-PCS | Mod: PBBFAC,,, | Performed by: ADVANCED PRACTICE MIDWIFE

## 2020-08-11 NOTE — PROGRESS NOTES
Reason for visit: Routine Prenatal Visit    HPI:   30 y.o., at 24w3d by Estimated Date of Delivery: 20    In with no c/o    ROS:  OBSTETRICS  - Contractions: none  - Bleeding: none  - Loss of fluid: none  - Fetal movement: reports good FM, reinforced BID  GASTRO  - Nausea: none  - Vomiting: none  NEURO  - Headache: none      Past medical, surgical, social, and family, and history: Reviewed and updated in EMR.  Medications: Reviewed and updated in EMR.  Allergies: Patient has no known allergies.     OB History    Para Term  AB Living   2 1 1     1   SAB TAB Ectopic Multiple Live Births           1      # Outcome Date GA Lbr Anthony/2nd Weight Sex Delivery Anes PTL Lv   2 Current            1 Term 07/10/09    M CS-Unspec   KVNG       Pregnancy dating, labs, ultrasound reports, prenatal testing, and problem list: Reviewed and updated in EMR.     Problems (from 20 to present)     Problem Noted Resolved    Supervision of other normal pregnancy, antepartum 2020 by ALICE Hannah MD No    Overview Addendum 2020 10:32 AM by ALICE Hannah MD     Dating - By [facility] u/s at 15 weeks.  U/S - 2020: normal anatomy.  Aneuploidy screening - Missed window.  Vaccines -  Contraception -  Pap - 2020: NILM/HPV(-)         Maternal care for scar from previous  delivery 2019 by ALICE Hannah MD No    Overview Signed 2020 10:29 AM by ALICE Hannah MD     - For breech.                 Vitals: /80   Wt 59 kg (130 lb 1.1 oz)   LMP 2020 (Approximate)   BMI 24.58 kg/m²     Physical exam:  GENERAL: No acute distress, normal appearance  NECK: Supple, normal ROM  SKIN: No rashes  NEURO: Alert and oriented x3  PSYCH: Normal mood and affect  CARDIO: Normal  PULMONARY: Normal respiratory effort; no respiratory distress  ABD: Soft, gravid, nontender; no hernia     Assessment and Plan:    Encounter for supervision of other normal pregnancy in third  trimester      Discussed DM screen next visit  Follow-up: 3weeks

## 2020-09-11 ENCOUNTER — APPOINTMENT (OUTPATIENT)
Dept: LAB | Facility: HOSPITAL | Age: 30
End: 2020-09-11
Attending: ADVANCED PRACTICE MIDWIFE
Payer: MEDICAID

## 2020-09-11 ENCOUNTER — ROUTINE PRENATAL (OUTPATIENT)
Dept: OBSTETRICS AND GYNECOLOGY | Facility: CLINIC | Age: 30
End: 2020-09-11
Payer: MEDICAID

## 2020-09-11 VITALS
SYSTOLIC BLOOD PRESSURE: 120 MMHG | BODY MASS INDEX: 25.12 KG/M2 | WEIGHT: 132.94 LBS | DIASTOLIC BLOOD PRESSURE: 62 MMHG

## 2020-09-11 DIAGNOSIS — Z34.83 ENCOUNTER FOR SUPERVISION OF OTHER NORMAL PREGNANCY IN THIRD TRIMESTER: Primary | ICD-10-CM

## 2020-09-11 PROCEDURE — 99999 PR PBB SHADOW E&M-EST. PATIENT-LVL II: ICD-10-PCS | Mod: PBBFAC,,, | Performed by: ADVANCED PRACTICE MIDWIFE

## 2020-09-11 PROCEDURE — 99212 OFFICE O/P EST SF 10 MIN: CPT | Mod: PBBFAC,TH,PO | Performed by: ADVANCED PRACTICE MIDWIFE

## 2020-09-11 PROCEDURE — 82950 GLUCOSE TEST: CPT

## 2020-09-11 PROCEDURE — 99213 PR OFFICE/OUTPT VISIT, EST, LEVL III, 20-29 MIN: ICD-10-PCS | Mod: TH,S$PBB,, | Performed by: ADVANCED PRACTICE MIDWIFE

## 2020-09-11 PROCEDURE — 85025 COMPLETE CBC W/AUTO DIFF WBC: CPT

## 2020-09-11 PROCEDURE — 99999 PR PBB SHADOW E&M-EST. PATIENT-LVL II: CPT | Mod: PBBFAC,,, | Performed by: ADVANCED PRACTICE MIDWIFE

## 2020-09-11 PROCEDURE — 99213 OFFICE O/P EST LOW 20 MIN: CPT | Mod: TH,S$PBB,, | Performed by: ADVANCED PRACTICE MIDWIFE

## 2020-09-11 NOTE — PROGRESS NOTES
Reason for visit: Routine Prenatal Visit    HPI:   30 y.o., at 28w6d by Estimated Date of Delivery: 20    In with no c/o    ROS:  OBSTETRICS  - Contractions: denies  - Bleeding: denies  - Loss of fluid: denies  - Fetal movement: reports good FM, reinforced BID  GASTRO  - Nausea: none  - Vomiting: none  NEURO  - Headache: none      Past medical, surgical, social, family, and OB history: Reviewed and updated in EMR.  Medications: Reviewed and updated in EMR.  Allergies: Patient has no known allergies.   Pregnancy dating, labs, ultrasound reports, prenatal testing, and problem list: Reviewed and updated in EMR.     Problems (from 20 to present)     Problem Noted Resolved    Supervision of other normal pregnancy, antepartum 2020 by ALICE Hannah MD No    Overview Addendum 2020 10:32 AM by ALICE Hannah MD     Dating - By [facility] u/s at 15 weeks.  U/S - 2020: normal anatomy.  Aneuploidy screening - Missed window.  Vaccines -  Contraception -  Pap - 2020: NILM/HPV(-)         Maternal care for scar from previous  delivery 2019 by ALICE Hannah MD No    Overview Signed 2020 10:29 AM by ALICE Hannah MD     - For breech.                 Vitals: /62   Wt 60.3 kg (132 lb 15 oz)   LMP 2020 (Approximate)   BMI 25.12 kg/m²     Physical exam:  GENERAL: No acute distress, normal appearance  NECK: Supple, normal ROM  SKIN: No rashes  NEURO: Alert and oriented x3  PSYCH: Normal mood and affect  CARDIO: Trace bilateral LE edema  PULMONARY: Normal respiratory effort; no respiratory distress  ABD: Soft, gravid, nontender; no hernia or hepatosplenomegaly    Assessment and Plan:    Encounter for supervision of other normal pregnancy in third trimester  -     OB Glucose Screen  -     CBC auto differential          DM screen done today   labor precautions given  Follow-up: 2 weeks

## 2020-09-12 LAB
BASOPHILS # BLD AUTO: 0.04 K/UL (ref 0–0.2)
BASOPHILS NFR BLD: 0.5 % (ref 0–1.9)
DIFFERENTIAL METHOD: ABNORMAL
EOSINOPHIL # BLD AUTO: 0.1 K/UL (ref 0–0.5)
EOSINOPHIL NFR BLD: 0.7 % (ref 0–8)
ERYTHROCYTE [DISTWIDTH] IN BLOOD BY AUTOMATED COUNT: 12.2 % (ref 11.5–14.5)
GLUCOSE SERPL-MCNC: 111 MG/DL (ref 70–140)
HCT VFR BLD AUTO: 34.4 % (ref 37–48.5)
HGB BLD-MCNC: 10.6 G/DL (ref 12–16)
IMM GRANULOCYTES # BLD AUTO: 0.04 K/UL (ref 0–0.04)
IMM GRANULOCYTES NFR BLD AUTO: 0.5 % (ref 0–0.5)
LYMPHOCYTES # BLD AUTO: 1.8 K/UL (ref 1–4.8)
LYMPHOCYTES NFR BLD: 21.2 % (ref 18–48)
MCH RBC QN AUTO: 31.2 PG (ref 27–31)
MCHC RBC AUTO-ENTMCNC: 30.8 G/DL (ref 32–36)
MCV RBC AUTO: 101 FL (ref 82–98)
MONOCYTES # BLD AUTO: 0.8 K/UL (ref 0.3–1)
MONOCYTES NFR BLD: 9.4 % (ref 4–15)
NEUTROPHILS # BLD AUTO: 5.8 K/UL (ref 1.8–7.7)
NEUTROPHILS NFR BLD: 67.7 % (ref 38–73)
NRBC BLD-RTO: 0 /100 WBC
PLATELET # BLD AUTO: 190 K/UL (ref 150–350)
PMV BLD AUTO: 13 FL (ref 9.2–12.9)
RBC # BLD AUTO: 3.4 M/UL (ref 4–5.4)
WBC # BLD AUTO: 8.54 K/UL (ref 3.9–12.7)

## 2020-09-30 ENCOUNTER — ROUTINE PRENATAL (OUTPATIENT)
Dept: OBSTETRICS AND GYNECOLOGY | Facility: CLINIC | Age: 30
End: 2020-09-30
Payer: MEDICAID

## 2020-09-30 VITALS
SYSTOLIC BLOOD PRESSURE: 110 MMHG | WEIGHT: 136.25 LBS | DIASTOLIC BLOOD PRESSURE: 70 MMHG | BODY MASS INDEX: 25.74 KG/M2

## 2020-09-30 DIAGNOSIS — Z34.83 ENCOUNTER FOR SUPERVISION OF OTHER NORMAL PREGNANCY IN THIRD TRIMESTER: Primary | ICD-10-CM

## 2020-09-30 PROCEDURE — 99212 OFFICE O/P EST SF 10 MIN: CPT | Mod: PBBFAC,TH,PO,25 | Performed by: ADVANCED PRACTICE MIDWIFE

## 2020-09-30 PROCEDURE — 90686 IIV4 VACC NO PRSV 0.5 ML IM: CPT | Mod: PBBFAC,PO

## 2020-09-30 PROCEDURE — 99213 OFFICE O/P EST LOW 20 MIN: CPT | Mod: TH,S$PBB,, | Performed by: ADVANCED PRACTICE MIDWIFE

## 2020-09-30 PROCEDURE — 99999 PR PBB SHADOW E&M-EST. PATIENT-LVL II: ICD-10-PCS | Mod: PBBFAC,,, | Performed by: ADVANCED PRACTICE MIDWIFE

## 2020-09-30 PROCEDURE — 99999 PR PBB SHADOW E&M-EST. PATIENT-LVL II: CPT | Mod: PBBFAC,,, | Performed by: ADVANCED PRACTICE MIDWIFE

## 2020-09-30 PROCEDURE — 99213 PR OFFICE/OUTPT VISIT, EST, LEVL III, 20-29 MIN: ICD-10-PCS | Mod: TH,S$PBB,, | Performed by: ADVANCED PRACTICE MIDWIFE

## 2020-09-30 NOTE — PROGRESS NOTES
Reason for visit: Routine Prenatal Visit    HPI:   30 y.o., at 31w4d by Estimated Date of Delivery: 20    In with no c.o    ROS:  OBSTETRICS  - Contractions: denies  - Bleeding: denies  - Loss of fluid: denies  - Fetal movement: reports good FM, reinforced BId  GASTRO  - Nausea: none  - Vomiting: none  NEURO  - Headache: none      Past medical, surgical, social, family, and OB history: Reviewed and updated in EMR.  Medications: Reviewed and updated in EMR.  Allergies: Patient has no known allergies.   Pregnancy dating, labs, ultrasound reports, prenatal testing, and problem list: Reviewed and updated in EMR.     Problems (from 20 to present)     Problem Noted Resolved    Supervision of other normal pregnancy, antepartum 2020 by ALICE Hannah MD No    Overview Addendum 2020 10:32 AM by ALICE Hannah MD     Dating - By [facility] u/s at 15 weeks.  U/S - 2020: normal anatomy.  Aneuploidy screening - Missed window.  Vaccines -  Contraception -  Pap - 2020: NILM/HPV(-)         Maternal care for scar from previous  delivery 2019 by ALICE Hannah MD No    Overview Signed 2020 10:29 AM by ALICE Hannah MD     - For breech.                 Vitals: /70   Wt 61.8 kg (136 lb 3.9 oz)   LMP 2020 (Approximate)   BMI 25.74 kg/m²     Physical exam:  GENERAL: No acute distress, normal appearance  NECK: Supple, normal ROM  SKIN: No rashes  NEURO: Alert and oriented x3  PSYCH: Normal mood and affect  CARDIO: Trace bilateral LE edema  PULMONARY: Normal respiratory effort; no respiratory distress  ABD: Soft, gravid, nontender; no hernia or hepatosplenomegaly    Assessment and Plan:    Encounter for supervision of other normal pregnancy in third trimester        Discussed rec for TDAP/ Flu- received flu today and TDAP next visit     labor precautions given  Follow-up: 2 weeks

## 2020-10-14 ENCOUNTER — ROUTINE PRENATAL (OUTPATIENT)
Dept: OBSTETRICS AND GYNECOLOGY | Facility: CLINIC | Age: 30
End: 2020-10-14
Payer: MEDICAID

## 2020-10-14 VITALS
WEIGHT: 137.56 LBS | SYSTOLIC BLOOD PRESSURE: 100 MMHG | DIASTOLIC BLOOD PRESSURE: 80 MMHG | BODY MASS INDEX: 25.99 KG/M2

## 2020-10-14 DIAGNOSIS — Z34.83 ENCOUNTER FOR SUPERVISION OF OTHER NORMAL PREGNANCY IN THIRD TRIMESTER: Primary | ICD-10-CM

## 2020-10-14 PROCEDURE — 99213 OFFICE O/P EST LOW 20 MIN: CPT | Mod: TH,S$PBB,, | Performed by: ADVANCED PRACTICE MIDWIFE

## 2020-10-14 PROCEDURE — 99999 PR PBB SHADOW E&M-EST. PATIENT-LVL II: CPT | Mod: PBBFAC,,, | Performed by: ADVANCED PRACTICE MIDWIFE

## 2020-10-14 PROCEDURE — 90471 IMMUNIZATION ADMIN: CPT | Mod: PBBFAC,PO

## 2020-10-14 PROCEDURE — 99999 PR PBB SHADOW E&M-EST. PATIENT-LVL II: ICD-10-PCS | Mod: PBBFAC,,, | Performed by: ADVANCED PRACTICE MIDWIFE

## 2020-10-14 PROCEDURE — 99212 OFFICE O/P EST SF 10 MIN: CPT | Mod: PBBFAC,TH,PO,25 | Performed by: ADVANCED PRACTICE MIDWIFE

## 2020-10-14 PROCEDURE — 99213 PR OFFICE/OUTPT VISIT, EST, LEVL III, 20-29 MIN: ICD-10-PCS | Mod: TH,S$PBB,, | Performed by: ADVANCED PRACTICE MIDWIFE

## 2020-10-14 NOTE — PROGRESS NOTES
Reason for visit: Routine Prenatal Visit    HPI:   30 y.o., at 33w4d by Estimated Date of Delivery: 20    In with no c/o    ROS:  OBSTETRICS  - Contractions: denies  - Bleeding: denies  - Loss of fluid: denies  - Fetal movement: reports good FM, reinforced BID  GASTRO  - Nausea: none  - Vomiting: none  NEURO  - Headache: none      Past medical, surgical, social, family, and OB history: Reviewed and updated in EMR.  Medications: Reviewed and updated in EMR.  Allergies: Patient has no known allergies.   Pregnancy dating, labs, ultrasound reports, prenatal testing, and problem list: Reviewed and updated in EMR.     Problems (from 20 to present)     Problem Noted Resolved    Supervision of other normal pregnancy, antepartum 2020 by ALICE Hannah MD No    Overview Addendum 2020 10:32 AM by ALICE Hannah MD     Dating - By [facility] u/s at 15 weeks.  U/S - 2020: normal anatomy.  Aneuploidy screening - Missed window.  Vaccines -  Contraception -  Pap - 2020: NILM/HPV(-)         Maternal care for scar from previous  delivery 2019 by ALICE Hannah MD No    Overview Signed 2020 10:29 AM by ALICE Hannah MD     - For breech.                 Vitals: /80   Wt 62.4 kg (137 lb 9.1 oz)   LMP 2020 (Approximate)   BMI 25.99 kg/m²     Physical exam:  GENERAL: No acute distress, normal appearance  NECK: Supple, normal ROM  SKIN: No rashes  NEURO: Alert and oriented x3  PSYCH: Normal mood and affect  CARDIO: Trace bilateral LE edema  PULMONARY: Normal respiratory effort; no respiratory distress  ABD: Soft, gravid, nontender; no hernia or hepatosplenomegaly    Assessment and Plan:    Encounter for supervision of other normal pregnancy in third trimester    Other orders  -     (In Office Administered) Tdap Vaccine             labor precautions given  Follow-up: 2 weeks

## 2020-10-16 ENCOUNTER — HOSPITAL ENCOUNTER (EMERGENCY)
Facility: OTHER | Age: 30
Discharge: HOME | End: 2020-10-16
Attending: OBSTETRICS & GYNECOLOGY
Payer: MEDICAID

## 2020-10-16 VITALS
TEMPERATURE: 99 F | OXYGEN SATURATION: 100 % | SYSTOLIC BLOOD PRESSURE: 129 MMHG | HEART RATE: 102 BPM | RESPIRATION RATE: 16 BRPM | DIASTOLIC BLOOD PRESSURE: 76 MMHG

## 2020-10-16 DIAGNOSIS — Z3A.33 33 WEEKS GESTATION OF PREGNANCY: Primary | ICD-10-CM

## 2020-10-16 DIAGNOSIS — R10.9 CRAMPING AFFECTING PREGNANCY, ANTEPARTUM: ICD-10-CM

## 2020-10-16 DIAGNOSIS — O26.899 CRAMPING AFFECTING PREGNANCY, ANTEPARTUM: ICD-10-CM

## 2020-10-16 LAB
BILIRUB SERPL-MCNC: NEGATIVE MG/DL
BILIRUB UR QL STRIP: NEGATIVE
BLOOD URINE, POC: NEGATIVE
CLARITY UR: CLEAR
COLOR UR: YELLOW
COLOR, POC UA: NORMAL
GLUCOSE UR QL STRIP: NEGATIVE
GLUCOSE UR QL STRIP: NEGATIVE
HGB UR QL STRIP: NEGATIVE
KETONES UR QL STRIP: ABNORMAL
KETONES UR QL STRIP: NORMAL
LEUKOCYTE ESTERASE UR QL STRIP: NEGATIVE
LEUKOCYTE ESTERASE URINE, POC: NEGATIVE
NITRITE UR QL STRIP: NEGATIVE
NITRITE, POC UA: NEGATIVE
PH UR STRIP: 7 [PH] (ref 5–8)
PH, POC UA: 6
PROT UR QL STRIP: NEGATIVE
PROTEIN, POC: NORMAL
SP GR UR STRIP: 1.02 (ref 1–1.03)
SPECIFIC GRAVITY, POC UA: 1.01
URN SPEC COLLECT METH UR: ABNORMAL
UROBILINOGEN UR STRIP-ACNC: NEGATIVE EU/DL
UROBILINOGEN, POC UA: NEGATIVE

## 2020-10-16 PROCEDURE — 59025 PR FETAL 2N-STRESS TEST: ICD-10-PCS | Mod: 26,,, | Performed by: OBSTETRICS & GYNECOLOGY

## 2020-10-16 PROCEDURE — 99284 PR EMERGENCY DEPT VISIT,LEVEL IV: ICD-10-PCS | Mod: 25,,, | Performed by: OBSTETRICS & GYNECOLOGY

## 2020-10-16 PROCEDURE — 59025 FETAL NON-STRESS TEST: CPT

## 2020-10-16 PROCEDURE — 99284 EMERGENCY DEPT VISIT MOD MDM: CPT | Mod: 25

## 2020-10-16 PROCEDURE — 81003 URINALYSIS AUTO W/O SCOPE: CPT

## 2020-10-16 PROCEDURE — 99284 EMERGENCY DEPT VISIT MOD MDM: CPT | Mod: 25,,, | Performed by: OBSTETRICS & GYNECOLOGY

## 2020-10-16 PROCEDURE — 59025 FETAL NON-STRESS TEST: CPT | Mod: 26,,, | Performed by: OBSTETRICS & GYNECOLOGY

## 2020-10-16 NOTE — DISCHARGE INSTRUCTIONS
Call clinic [phone] or L & D after hours at [phone] for vaginal bleeding, leakage of fluids, contractions 4-5 in one hour, decreased fetal movements ( 10 kicks in 2 hours), headache not relieved by Tylenol, blurry vision, or temp of 100.4 or greater.  Begin doing fetal kick counts, at least 10 movements in 2 hours starting at 28 weeks gestation.  Keep next clinic appointment

## 2020-10-16 NOTE — ED PROVIDER NOTES
"Encounter Date: 10/16/2020       History     Chief Complaint   Patient presents with    Abdominal Pain     radiating to back    Rupture of Membranes     LOF "two days ago"     Sena Daniel is a 30 y.o. [id number] at 33w6d presents complaining of abdominal pain, back pain, vaginal spotting, and leaking of fluid. The patient states that she started feeling the pain yesterday afternoon, which worried her. She also noticed increased vaginal fluid/discharge for the past two days. States when she went to the bathroom this morning she noticed some blood in the toilet as if "I was starting my period". Denies recent sexual intercourse. States she has only felt baby move twice today, but states she has not eaten anything today and has not had much water intake.  This IUP is complicated by history of .  Patient reports contractions, reports vaginal bleeding, denies LOF.   Fetal Movement: decreased.          Review of patient's allergies indicates:  No Known Allergies  No past medical history on file.  Past Surgical History:   Procedure Laterality Date     SECTION      breech/tulane    GALLBLADDER SURGERY       No family history on file.  Social History     Tobacco Use    Smoking status: Former Smoker     Packs/day: 0.10     Years: 4.00     Pack years: 0.40     Quit date: 2019     Years since quittin.3    Smokeless tobacco: Never Used   Substance Use Topics    Alcohol use: Yes    Drug use: No     Review of Systems   Constitutional: Negative for chills, fatigue and fever.   HENT: Negative for congestion, rhinorrhea and sore throat.    Eyes: Negative for visual disturbance.   Respiratory: Negative for cough, chest tightness and shortness of breath.    Cardiovascular: Negative for chest pain and leg swelling.   Gastrointestinal: Positive for abdominal pain. Negative for nausea and vomiting.   Genitourinary: Positive for vaginal bleeding. Negative for dysuria and pelvic pain. "   Musculoskeletal: Positive for back pain.   Neurological: Negative for dizziness and headaches.       Physical Exam     Initial Vitals [10/16/20 1124]   BP Pulse Resp Temp SpO2   129/76 102 16 98.6 °F (37 °C) 100 %      MAP       --         Physical Exam    Constitutional: She appears well-developed and well-nourished. No distress.   HENT:   Head: Normocephalic and atraumatic.   Eyes: Conjunctivae and EOM are normal.   Neck: Normal range of motion. No thyromegaly present.   Cardiovascular: Normal rate.   Pulmonary/Chest: No respiratory distress.   Normal work of breathing.   Abdominal: Soft. There is no abdominal tenderness. There is no rebound and no guarding.   Gravid uterus.   Genitourinary:    Uterus normal.     Musculoskeletal: Normal range of motion. No tenderness or edema.   Neurological: She is alert and oriented to person, place, and time.   Skin: Skin is warm and dry.   Psychiatric: She has a normal mood and affect. Thought content normal.     OB LABOR EXAM:   Pre-Term Labor: No.   Membranes ruptured: No.   Method: Sterile speculum exam per MD and Sterile vaginal exam per MD.   Vaginal Bleeding: none present.     Dilatation: 0.   Station: -5.     Amniotic Fluid Color: no fluid.           ED Course   Obtain Fetal nonstress test (NST)    Date/Time: 10/16/2020 9:25 PM  Performed by: Diogenes López MD  Authorized by: Diogenes López MD     Nonstress Test:     Variability:  6-25 BPM    Decelerations:  None    Accelerations:  15 bpm    Baseline:  145    Uterine Irritability: Yes      Contractions:  Not present  Biophysical Profile:     Nonstress Test Interpretation: reactive      Overall Impression:  Reassuring      Labs Reviewed   URINALYSIS, REFLEX TO URINE CULTURE - Abnormal; Notable for the following components:       Result Value    Ketones, UA 1+ (*)     All other components within normal limits    Narrative:     Specimen Source->Urine   POCT URINALYSIS, DIPSTICK OR TABLET REAGENT, AUTOMATED, WITH  MICROSCOP          Imaging Results    None          Medical Decision Making:   Initial Assessment:   Sena Daniel is a 30 y.o. [id number] at 33w6d presents complaining of abdominal pain, back pain, vaginal spotting, and leaking of fluid.  - VSS, afebrile  - NST reactive and reassuring  - Urine dip: small ketones  - UA: 1+ ketones  - Wet prep: no clue cells, trichomonas, or yeast seen  - No pooling on speculum exam, nitrizine test negative  - SVE: close/thick/high, unchanged on repeat exam  - Patient feeling better declined Tylenol  - Patient stable for discharge home. Labor and pre eclampsia precautions given. Patient verbalized understanding, all questions answered. Plan discussed with hospitalist on call, who is in agreement with plan.                 Attending Attestation:   Physician Attestation Statement for Resident:  As the supervising MD   Physician Attestation Statement: I have personally seen and examined this patient.   I agree with the above history. -:   As the supervising MD I agree with the above PE.    As the supervising MD I agree with the above treatment, course, plan, and disposition.   -: Patient evaluated and found to be stable, agree with resident's assessment and plan.  I was personally present during the critical portions of the procedure(s) performed by the resident and was immediately available in the ED to provide services and assistance as needed during the entire procedure.  I have reviewed and agree with the residents interpretation of the following: lab data.  I have reviewed the following: old records at this facility.                    ED Course as of Oct 16 2123   Fri Oct 16, 2020   1152 NST reactive/reassuring, uterine irritability    [AR]   1340 Patient feeling better, declined tylenol, repeat SVE closed, unchanged.  Discharge home with PTL precautions, hydration encouraged. UA neg with 1+ ketones    [AR]      ED Course User Index  [AR] Ban Armijo MD             Clinical Impression:     ICD-10-CM ICD-9-CM   1. 33 weeks gestation of pregnancy  Z3A.33 V22.2   2. Cramping affecting pregnancy, antepartum  O26.899 646.83    R10.9 789.00                      Disposition:   Disposition: Discharged  Condition: Stable     ED Disposition Condition    Discharge Stable        ED Prescriptions     None        Follow-up Information    None                       Diogenes López M.D.  OB/GYN PGY-1                 Diogenes López MD  Resident  10/16/20 2125       Diogenes López MD  Resident  10/16/20 2126       Ban Armijo MD  10/18/20 0859

## 2020-10-16 NOTE — ED TRIAGE NOTES
"Pt presents to [facility] with c/o lower abdominal pain and back pain. +LOF, +VB "two days ago." +FM. Dr. López notified.  "

## 2020-11-06 ENCOUNTER — ROUTINE PRENATAL (OUTPATIENT)
Dept: OBSTETRICS AND GYNECOLOGY | Facility: CLINIC | Age: 30
End: 2020-11-06
Payer: MEDICAID

## 2020-11-06 ENCOUNTER — PROCEDURE VISIT (OUTPATIENT)
Dept: OBSTETRICS AND GYNECOLOGY | Facility: CLINIC | Age: 30
End: 2020-11-06
Payer: MEDICAID

## 2020-11-06 VITALS
BODY MASS INDEX: 26.53 KG/M2 | DIASTOLIC BLOOD PRESSURE: 70 MMHG | WEIGHT: 140.44 LBS | SYSTOLIC BLOOD PRESSURE: 102 MMHG

## 2020-11-06 DIAGNOSIS — Z34.80 SUPERVISION OF OTHER NORMAL PREGNANCY, ANTEPARTUM: ICD-10-CM

## 2020-11-06 DIAGNOSIS — Z3A.36 36 WEEKS GESTATION OF PREGNANCY: Primary | ICD-10-CM

## 2020-11-06 DIAGNOSIS — Z3A.36 36 WEEKS GESTATION OF PREGNANCY: ICD-10-CM

## 2020-11-06 LAB
BASOPHILS # BLD AUTO: 0.05 K/UL (ref 0–0.2)
BASOPHILS NFR BLD: 0.6 % (ref 0–1.9)
DIFFERENTIAL METHOD: ABNORMAL
EOSINOPHIL # BLD AUTO: 0.1 K/UL (ref 0–0.5)
EOSINOPHIL NFR BLD: 0.6 % (ref 0–8)
ERYTHROCYTE [DISTWIDTH] IN BLOOD BY AUTOMATED COUNT: 13.2 % (ref 11.5–14.5)
HCT VFR BLD AUTO: 35.4 % (ref 37–48.5)
HGB BLD-MCNC: 11.4 G/DL (ref 12–16)
IMM GRANULOCYTES # BLD AUTO: 0.07 K/UL (ref 0–0.04)
IMM GRANULOCYTES NFR BLD AUTO: 0.8 % (ref 0–0.5)
LYMPHOCYTES # BLD AUTO: 1.8 K/UL (ref 1–4.8)
LYMPHOCYTES NFR BLD: 20.9 % (ref 18–48)
MCH RBC QN AUTO: 31 PG (ref 27–31)
MCHC RBC AUTO-ENTMCNC: 32.2 G/DL (ref 32–36)
MCV RBC AUTO: 96 FL (ref 82–98)
MONOCYTES # BLD AUTO: 0.8 K/UL (ref 0.3–1)
MONOCYTES NFR BLD: 9.1 % (ref 4–15)
NEUTROPHILS # BLD AUTO: 5.8 K/UL (ref 1.8–7.7)
NEUTROPHILS NFR BLD: 68 % (ref 38–73)
NRBC BLD-RTO: 0 /100 WBC
PLATELET # BLD AUTO: 160 K/UL (ref 150–350)
PMV BLD AUTO: 13.2 FL (ref 9.2–12.9)
RBC # BLD AUTO: 3.68 M/UL (ref 4–5.4)
WBC # BLD AUTO: 8.47 K/UL (ref 3.9–12.7)

## 2020-11-06 PROCEDURE — 99213 OFFICE O/P EST LOW 20 MIN: CPT | Mod: TH,S$PBB,, | Performed by: ADVANCED PRACTICE MIDWIFE

## 2020-11-06 PROCEDURE — 99213 PR OFFICE/OUTPT VISIT, EST, LEVL III, 20-29 MIN: ICD-10-PCS | Mod: TH,S$PBB,, | Performed by: ADVANCED PRACTICE MIDWIFE

## 2020-11-06 PROCEDURE — 87081 CULTURE SCREEN ONLY: CPT

## 2020-11-06 PROCEDURE — 99999 PR PBB SHADOW E&M-EST. PATIENT-LVL II: CPT | Mod: PBBFAC,,, | Performed by: ADVANCED PRACTICE MIDWIFE

## 2020-11-06 PROCEDURE — 99212 OFFICE O/P EST SF 10 MIN: CPT | Mod: PBBFAC,TH,PN | Performed by: ADVANCED PRACTICE MIDWIFE

## 2020-11-06 PROCEDURE — 85025 COMPLETE CBC W/AUTO DIFF WBC: CPT

## 2020-11-06 PROCEDURE — 86703 HIV-1/HIV-2 1 RESULT ANTBDY: CPT

## 2020-11-06 PROCEDURE — 99999 PR PBB SHADOW E&M-EST. PATIENT-LVL II: ICD-10-PCS | Mod: PBBFAC,,, | Performed by: ADVANCED PRACTICE MIDWIFE

## 2020-11-06 PROCEDURE — 86592 SYPHILIS TEST NON-TREP QUAL: CPT

## 2020-11-06 NOTE — PROGRESS NOTES
Patient is complaining of having pelvic pain and pressure for 2 days and thinks that she is leaking liquid from her vagina.

## 2020-11-06 NOTE — PROGRESS NOTES
Reason for visit: Routine Prenatal Visit    HPI:   30 y.o., at 36w6d by Estimated Date of Delivery: 20    Reports pressure and increased vaginal discharge- denies leaking fluid.    ROS:  OBSTETRICS  - Contractions: denies  - Bleeding: denies  - Loss of fluid: reports increase discharge, not fluid  - Fetal movement: reports good Fm, reinforced BID  GASTRO  - Nausea: none  - Vomiting: none  NEURO  - Headache: none      Past medical, surgical, social, family, and OB history: Reviewed and updated in EMR.  Medications: Reviewed and updated in EMR.  Allergies: Patient has no known allergies.   Pregnancy dating, labs, ultrasound reports, prenatal testing, and problem list: Reviewed and updated in EMR.     Problems (from 20 to present)     Problem Noted Resolved    Supervision of other normal pregnancy, antepartum 2020 by ALICE Hannah MD No    Overview Addendum 2020 10:32 AM by ALICE Hannah MD     Dating - By [facility] u/s at 15 weeks.  U/S - 2020: normal anatomy.  Aneuploidy screening - Missed window.  Vaccines -  Contraception -  Pap - 2020: NILM/HPV(-)         Maternal care for scar from previous  delivery 2019 by ALICE Hannah MD No    Overview Signed 2020 10:29 AM by ALICE Hannah MD     - For breech.                 Vitals: /70   Wt 63.7 kg (140 lb 6.9 oz)   LMP 2020 (Approximate)   BMI 26.53 kg/m²     Physical exam:  GENERAL: No acute distress, normal appearance  NECK: Supple, normal ROM  SKIN: No rashes  NEURO: Alert and oriented x3  PSYCH: Normal mood and affect  CARDIO: Trace bilateral LE edema  PULMONARY: Normal respiratory effort; no respiratory distress  ABD: Soft, gravid, nontender; no hernia or hepatosplenomegaly    Assessment and Plan:    36 weeks gestation of pregnancy  -     CBC Auto Differential; Future; Expected date: 2020  -     HIV 1/2 Ag/Ab (4th Gen); Future; Expected date: 2020  -     RPR; Future;  Expected date: 2020  -     Strep B Screen, Vaginal / Rectal    Supervision of other normal pregnancy, antepartum      GBS/ labs done today  Reviewed s/s active labor, rom, bleeding and if occur report to L&D.   Lengthy discussion re  or repeat c/s.  consent signed and pt desires to discuss again when closer to 39 weeks.   labor precautions given  Follow-up:1 weeks

## 2020-11-07 LAB — RPR SER QL: NORMAL

## 2020-11-09 LAB
BACTERIA SPEC AEROBE CULT: NORMAL
HIV 1+2 AB+HIV1 P24 AG SERPL QL IA: NEGATIVE

## 2020-11-15 ENCOUNTER — HOSPITAL ENCOUNTER (EMERGENCY)
Facility: OTHER | Age: 30
Discharge: HOME | End: 2020-11-15
Payer: MEDICAID

## 2020-11-15 VITALS
HEART RATE: 90 BPM | TEMPERATURE: 98 F | OXYGEN SATURATION: 99 % | RESPIRATION RATE: 16 BRPM | DIASTOLIC BLOOD PRESSURE: 72 MMHG | SYSTOLIC BLOOD PRESSURE: 121 MMHG

## 2020-11-15 DIAGNOSIS — O47.9 UTERINE CONTRACTIONS DURING PREGNANCY: Primary | ICD-10-CM

## 2020-11-15 DIAGNOSIS — Z3A.38 38 WEEKS GESTATION OF PREGNANCY: ICD-10-CM

## 2020-11-15 PROCEDURE — 59025 FETAL NON-STRESS TEST: CPT | Mod: 26,,, | Performed by: OBSTETRICS & GYNECOLOGY

## 2020-11-15 PROCEDURE — 59025 FETAL NON-STRESS TEST: CPT

## 2020-11-15 PROCEDURE — 59025 OBTAIN FETAL NONSTRESS TEST (NST): ICD-10-PCS | Mod: 26,,, | Performed by: OBSTETRICS & GYNECOLOGY

## 2020-11-15 PROCEDURE — 99284 PR EMERGENCY DEPT VISIT,LEVEL IV: ICD-10-PCS | Mod: 25,,, | Performed by: OBSTETRICS & GYNECOLOGY

## 2020-11-15 PROCEDURE — 99284 EMERGENCY DEPT VISIT MOD MDM: CPT | Mod: 25,,, | Performed by: OBSTETRICS & GYNECOLOGY

## 2020-11-15 PROCEDURE — 99284 EMERGENCY DEPT VISIT MOD MDM: CPT | Mod: 25

## 2020-11-15 NOTE — DISCHARGE INSTRUCTIONS
Keep previously scheduled clinic appointment  Return to L&D if you experience contractions every 2-5 minutes for two consecutive hours  Vaginal Bleeding  Decreased fetal movement  Leaking of fluid  Headache, dizziness or blurred vision  Temperature 100.4 or greater  Call the clinic ([phone]) during the day time or L&D [phone]) after hours for any questions/concerns.  Drink 8-10 glasses of water a day    
Immediate family member

## 2020-11-16 ENCOUNTER — TELEPHONE (OUTPATIENT)
Dept: OBSTETRICS AND GYNECOLOGY | Facility: HOSPITAL | Age: 30
End: 2020-11-16

## 2020-11-16 NOTE — TELEPHONE ENCOUNTER
Called patient to return to [facility] for further monitoring given she received p.o. Morphine prior to discharge. Patient expressed understanding and will return to [facility] now.    Latrice Rubio M.D.  OB/GYN PGY-2

## 2020-11-17 ENCOUNTER — HOSPITAL ENCOUNTER (INPATIENT)
Facility: OTHER | Age: 30
LOS: 3 days | Discharge: HOME | End: 2020-11-20
Attending: OBSTETRICS & GYNECOLOGY | Admitting: OBSTETRICS & GYNECOLOGY
Payer: MEDICAID

## 2020-11-17 ENCOUNTER — ANESTHESIA (OUTPATIENT)
Dept: OBSTETRICS AND GYNECOLOGY | Facility: OTHER | Age: 30
End: 2020-11-17
Payer: MEDICAID

## 2020-11-17 ENCOUNTER — ANESTHESIA EVENT (OUTPATIENT)
Dept: OBSTETRICS AND GYNECOLOGY | Facility: OTHER | Age: 30
End: 2020-11-17
Payer: MEDICAID

## 2020-11-17 DIAGNOSIS — O34.219 VBAC, DELIVERED: ICD-10-CM

## 2020-11-17 DIAGNOSIS — O34.219 VBAC (VAGINAL BIRTH AFTER CESAREAN): Primary | ICD-10-CM

## 2020-11-17 DIAGNOSIS — O47.9 UTERINE CONTRACTIONS DURING PREGNANCY: ICD-10-CM

## 2020-11-17 DIAGNOSIS — Z3A.38 38 WEEKS GESTATION OF PREGNANCY: ICD-10-CM

## 2020-11-17 PROBLEM — Z37.9 NORMAL LABOR: Status: ACTIVE | Noted: 2020-11-17

## 2020-11-17 LAB
ABO + RH BLD: NORMAL
ALBUMIN SERPL BCP-MCNC: 2.7 G/DL (ref 3.5–5.2)
ALP SERPL-CCNC: 384 U/L (ref 55–135)
ALT SERPL W/O P-5'-P-CCNC: 104 U/L (ref 10–44)
ANION GAP SERPL CALC-SCNC: 14 MMOL/L (ref 8–16)
AST SERPL-CCNC: 76 U/L (ref 10–40)
BASOPHILS # BLD AUTO: 0.04 K/UL (ref 0–0.2)
BASOPHILS NFR BLD: 0.3 % (ref 0–1.9)
BILIRUB SERPL-MCNC: 0.7 MG/DL (ref 0.1–1)
BLD GP AB SCN CELLS X3 SERPL QL: NORMAL
BUN SERPL-MCNC: 5 MG/DL (ref 6–20)
CALCIUM SERPL-MCNC: 9.5 MG/DL (ref 8.7–10.5)
CHLORIDE SERPL-SCNC: 104 MMOL/L (ref 95–110)
CO2 SERPL-SCNC: 19 MMOL/L (ref 23–29)
CREAT SERPL-MCNC: 0.6 MG/DL (ref 0.5–1.4)
CREAT UR-MCNC: 76.3 MG/DL (ref 15–325)
DIFFERENTIAL METHOD: ABNORMAL
EOSINOPHIL # BLD AUTO: 0 K/UL (ref 0–0.5)
EOSINOPHIL NFR BLD: 0.1 % (ref 0–8)
ERYTHROCYTE [DISTWIDTH] IN BLOOD BY AUTOMATED COUNT: 13.2 % (ref 11.5–14.5)
EST. GFR  (AFRICAN AMERICAN): >60 ML/MIN/1.73 M^2
EST. GFR  (NON AFRICAN AMERICAN): >60 ML/MIN/1.73 M^2
GLUCOSE SERPL-MCNC: 76 MG/DL (ref 70–110)
HCT VFR BLD AUTO: 35.7 % (ref 37–48.5)
HGB BLD-MCNC: 12 G/DL (ref 12–16)
IMM GRANULOCYTES # BLD AUTO: 0.07 K/UL (ref 0–0.04)
IMM GRANULOCYTES NFR BLD AUTO: 0.5 % (ref 0–0.5)
LYMPHOCYTES # BLD AUTO: 1.3 K/UL (ref 1–4.8)
LYMPHOCYTES NFR BLD: 10 % (ref 18–48)
MCH RBC QN AUTO: 31.1 PG (ref 27–31)
MCHC RBC AUTO-ENTMCNC: 33.6 G/DL (ref 32–36)
MCV RBC AUTO: 93 FL (ref 82–98)
MONOCYTES # BLD AUTO: 0.7 K/UL (ref 0.3–1)
MONOCYTES NFR BLD: 5.5 % (ref 4–15)
NEUTROPHILS # BLD AUTO: 11.2 K/UL (ref 1.8–7.7)
NEUTROPHILS NFR BLD: 83.6 % (ref 38–73)
NRBC BLD-RTO: 0 /100 WBC
PLATELET # BLD AUTO: 159 K/UL (ref 150–350)
PLATELET BLD QL SMEAR: ABNORMAL
PMV BLD AUTO: 13 FL (ref 9.2–12.9)
POTASSIUM SERPL-SCNC: 4.1 MMOL/L (ref 3.5–5.1)
PROT SERPL-MCNC: 7.2 G/DL (ref 6–8.4)
PROT UR-MCNC: 21 MG/DL (ref 0–15)
PROT/CREAT UR: 0.28 MG/G{CREAT} (ref 0–0.2)
RBC # BLD AUTO: 3.86 M/UL (ref 4–5.4)
SARS-COV-2 RDRP RESP QL NAA+PROBE: NEGATIVE
SODIUM SERPL-SCNC: 137 MMOL/L (ref 136–145)
WBC # BLD AUTO: 13.41 K/UL (ref 3.9–12.7)

## 2020-11-17 PROCEDURE — 59409 OBSTETRICAL CARE: CPT | Mod: AA,,, | Performed by: ANESTHESIOLOGY

## 2020-11-17 PROCEDURE — 11000001 HC ACUTE MED/SURG PRIVATE ROOM

## 2020-11-17 PROCEDURE — 36415 COLL VENOUS BLD VENIPUNCTURE: CPT

## 2020-11-17 PROCEDURE — C1751 CATH, INF, PER/CENT/MIDLINE: HCPCS | Performed by: ANESTHESIOLOGY

## 2020-11-17 PROCEDURE — 85025 COMPLETE CBC W/AUTO DIFF WBC: CPT

## 2020-11-17 PROCEDURE — 59025 FETAL NON-STRESS TEST: CPT | Mod: 26,,, | Performed by: OBSTETRICS & GYNECOLOGY

## 2020-11-17 PROCEDURE — 82570 ASSAY OF URINE CREATININE: CPT

## 2020-11-17 PROCEDURE — 25000003 PHARM REV CODE 250: Performed by: STUDENT IN AN ORGANIZED HEALTH CARE EDUCATION/TRAINING PROGRAM

## 2020-11-17 PROCEDURE — 59025 FETAL NON-STRESS TEST: CPT

## 2020-11-17 PROCEDURE — 27200710 HC EPIDURAL INFUSION PUMP SET: Performed by: ANESTHESIOLOGY

## 2020-11-17 PROCEDURE — 80053 COMPREHEN METABOLIC PANEL: CPT

## 2020-11-17 PROCEDURE — 59025 PR FETAL 2N-STRESS TEST: ICD-10-PCS | Mod: 26,,, | Performed by: OBSTETRICS & GYNECOLOGY

## 2020-11-17 PROCEDURE — 99284 EMERGENCY DEPT VISIT MOD MDM: CPT | Mod: 25

## 2020-11-17 PROCEDURE — 59409 PRA ETRICAL CARE,VAG DELIV ONLY: ICD-10-PCS | Mod: AA,,, | Performed by: ANESTHESIOLOGY

## 2020-11-17 PROCEDURE — U0002 COVID-19 LAB TEST NON-CDC: HCPCS

## 2020-11-17 PROCEDURE — 99283 PR EMERGENCY DEPT VISIT,LEVEL III: ICD-10-PCS | Mod: 25,,, | Performed by: OBSTETRICS & GYNECOLOGY

## 2020-11-17 PROCEDURE — 99283 EMERGENCY DEPT VISIT LOW MDM: CPT | Mod: 25,,, | Performed by: OBSTETRICS & GYNECOLOGY

## 2020-11-17 PROCEDURE — 86901 BLOOD TYPING SEROLOGIC RH(D): CPT

## 2020-11-17 PROCEDURE — 63600175 PHARM REV CODE 636 W HCPCS: Performed by: STUDENT IN AN ORGANIZED HEALTH CARE EDUCATION/TRAINING PROGRAM

## 2020-11-17 RX ORDER — OXYTOCIN/RINGER'S LACTATE 30/500 ML
2 PLASTIC BAG, INJECTION (ML) INTRAVENOUS CONTINUOUS
Status: DISCONTINUED | OUTPATIENT
Start: 2020-11-17 | End: 2020-11-18

## 2020-11-17 RX ORDER — ONDANSETRON 8 MG/1
8 TABLET, ORALLY DISINTEGRATING ORAL EVERY 8 HOURS PRN
Status: DISCONTINUED | OUTPATIENT
Start: 2020-11-17 | End: 2020-11-18

## 2020-11-17 RX ORDER — SIMETHICONE 80 MG
1 TABLET,CHEWABLE ORAL 4 TIMES DAILY PRN
Status: DISCONTINUED | OUTPATIENT
Start: 2020-11-17 | End: 2020-11-18

## 2020-11-17 RX ORDER — ONDANSETRON 2 MG/ML
4 INJECTION INTRAMUSCULAR; INTRAVENOUS ONCE
Status: DISCONTINUED | OUTPATIENT
Start: 2020-11-17 | End: 2020-11-18

## 2020-11-17 RX ORDER — CALCIUM GLUCONATE 98 MG/ML
1 INJECTION, SOLUTION INTRAVENOUS
Status: DISCONTINUED | OUTPATIENT
Start: 2020-11-17 | End: 2020-11-20 | Stop reason: HOSPADM

## 2020-11-17 RX ORDER — MAGNESIUM SULFATE HEPTAHYDRATE 40 MG/ML
4 INJECTION, SOLUTION INTRAVENOUS ONCE
Status: COMPLETED | OUTPATIENT
Start: 2020-11-17 | End: 2020-11-17

## 2020-11-17 RX ORDER — SODIUM CHLORIDE, SODIUM LACTATE, POTASSIUM CHLORIDE, CALCIUM CHLORIDE 600; 310; 30; 20 MG/100ML; MG/100ML; MG/100ML; MG/100ML
INJECTION, SOLUTION INTRAVENOUS CONTINUOUS
Status: DISCONTINUED | OUTPATIENT
Start: 2020-11-17 | End: 2020-11-18

## 2020-11-17 RX ORDER — SODIUM CHLORIDE 9 MG/ML
INJECTION, SOLUTION INTRAVENOUS
Status: DISCONTINUED | OUTPATIENT
Start: 2020-11-17 | End: 2020-11-18

## 2020-11-17 RX ORDER — FENTANYL/BUPIVACAINE/NS/PF 2MCG/ML-.1
PLASTIC BAG, INJECTION (ML) INJECTION CONTINUOUS PRN
Status: DISCONTINUED | OUTPATIENT
Start: 2020-11-17 | End: 2020-11-18

## 2020-11-17 RX ORDER — MISOPROSTOL 200 UG/1
TABLET ORAL
Status: DISCONTINUED
Start: 2020-11-17 | End: 2020-11-18 | Stop reason: WASHOUT

## 2020-11-17 RX ORDER — SODIUM CITRATE AND CITRIC ACID MONOHYDRATE 334; 500 MG/5ML; MG/5ML
30 SOLUTION ORAL ONCE
Status: DISCONTINUED | OUTPATIENT
Start: 2020-11-17 | End: 2020-11-18

## 2020-11-17 RX ORDER — FAMOTIDINE 10 MG/ML
20 INJECTION INTRAVENOUS ONCE
Status: DISCONTINUED | OUTPATIENT
Start: 2020-11-17 | End: 2020-11-18

## 2020-11-17 RX ORDER — FENTANYL/BUPIVACAINE/NS/PF 2MCG/ML-.1
PLASTIC BAG, INJECTION (ML) INJECTION CONTINUOUS
Status: DISCONTINUED | OUTPATIENT
Start: 2020-11-17 | End: 2020-11-18

## 2020-11-17 RX ORDER — FENTANYL/BUPIVACAINE/NS/PF 2MCG/ML-.1
PLASTIC BAG, INJECTION (ML) INJECTION CONTINUOUS PRN
Status: DISCONTINUED | OUTPATIENT
Start: 2020-11-17 | End: 2020-11-17

## 2020-11-17 RX ORDER — BUPIVACAINE HYDROCHLORIDE 2.5 MG/ML
INJECTION, SOLUTION EPIDURAL; INFILTRATION; INTRACAUDAL
Status: DISPENSED
Start: 2020-11-17 | End: 2020-11-18

## 2020-11-17 RX ORDER — LIDOCAINE HYDROCHLORIDE AND EPINEPHRINE 15; 5 MG/ML; UG/ML
INJECTION, SOLUTION EPIDURAL
Status: DISCONTINUED | OUTPATIENT
Start: 2020-11-17 | End: 2020-11-18

## 2020-11-17 RX ORDER — BUPIVACAINE HYDROCHLORIDE 2.5 MG/ML
INJECTION, SOLUTION EPIDURAL; INFILTRATION; INTRACAUDAL
Status: COMPLETED
Start: 2020-11-17 | End: 2020-11-17

## 2020-11-17 RX ORDER — METHYLERGONOVINE MALEATE 0.2 MG/ML
INJECTION INTRAVENOUS
Status: DISCONTINUED
Start: 2020-11-17 | End: 2020-11-18 | Stop reason: WASHOUT

## 2020-11-17 RX ORDER — FENTANYL CITRATE 50 UG/ML
INJECTION, SOLUTION INTRAMUSCULAR; INTRAVENOUS
Status: COMPLETED
Start: 2020-11-17 | End: 2020-11-17

## 2020-11-17 RX ORDER — SODIUM CHLORIDE, SODIUM LACTATE, POTASSIUM CHLORIDE, CALCIUM CHLORIDE 600; 310; 30; 20 MG/100ML; MG/100ML; MG/100ML; MG/100ML
INJECTION, SOLUTION INTRAVENOUS CONTINUOUS
Status: DISCONTINUED | OUTPATIENT
Start: 2020-11-17 | End: 2020-11-20 | Stop reason: HOSPADM

## 2020-11-17 RX ORDER — MAGNESIUM SULFATE HEPTAHYDRATE 40 MG/ML
2 INJECTION, SOLUTION INTRAVENOUS CONTINUOUS
Status: DISCONTINUED | OUTPATIENT
Start: 2020-11-17 | End: 2020-11-20 | Stop reason: HOSPADM

## 2020-11-17 RX ORDER — FENTANYL/BUPIVACAINE/NS/PF 2MCG/ML-.1
PLASTIC BAG, INJECTION (ML) INJECTION
Status: COMPLETED
Start: 2020-11-17 | End: 2020-11-17

## 2020-11-17 RX ORDER — CALCIUM CARBONATE 200(500)MG
500 TABLET,CHEWABLE ORAL 3 TIMES DAILY PRN
Status: DISCONTINUED | OUTPATIENT
Start: 2020-11-17 | End: 2020-11-18

## 2020-11-17 RX ORDER — CARBOPROST TROMETHAMINE 250 UG/ML
INJECTION, SOLUTION INTRAMUSCULAR
Status: DISCONTINUED
Start: 2020-11-17 | End: 2020-11-18 | Stop reason: WASHOUT

## 2020-11-17 RX ORDER — BUTORPHANOL TARTRATE 1 MG/ML
1 INJECTION INTRAMUSCULAR; INTRAVENOUS ONCE AS NEEDED
Status: DISCONTINUED | OUTPATIENT
Start: 2020-11-17 | End: 2020-11-18

## 2020-11-17 RX ORDER — OXYTOCIN/RINGER'S LACTATE 30/500 ML
95 PLASTIC BAG, INJECTION (ML) INTRAVENOUS ONCE
Status: COMPLETED | OUTPATIENT
Start: 2020-11-17 | End: 2020-11-18

## 2020-11-17 RX ORDER — ACETAMINOPHEN 325 MG/1
650 TABLET ORAL ONCE
Status: COMPLETED | OUTPATIENT
Start: 2020-11-17 | End: 2020-11-17

## 2020-11-17 RX ORDER — OXYTOCIN/RINGER'S LACTATE 30/500 ML
334 PLASTIC BAG, INJECTION (ML) INTRAVENOUS ONCE
Status: COMPLETED | OUTPATIENT
Start: 2020-11-17 | End: 2020-11-18

## 2020-11-17 RX ADMIN — MAGNESIUM SULFATE IN WATER 2 G/HR: 40 INJECTION, SOLUTION INTRAVENOUS at 06:11

## 2020-11-17 RX ADMIN — Medication 5 ML: at 12:11

## 2020-11-17 RX ADMIN — MAGNESIUM SULFATE HEPTAHYDRATE 4 G: 40 INJECTION, SOLUTION INTRAVENOUS at 06:11

## 2020-11-17 RX ADMIN — FENTANYL CITRATE 100 MCG: 50 INJECTION, SOLUTION INTRAMUSCULAR; INTRAVENOUS at 11:11

## 2020-11-17 RX ADMIN — Medication 10 ML/HR: at 12:11

## 2020-11-17 RX ADMIN — Medication 2 MILLI-UNITS/MIN: at 02:11

## 2020-11-17 RX ADMIN — Medication 10 ML: at 09:11

## 2020-11-17 RX ADMIN — Medication 10 ML: at 11:11

## 2020-11-17 RX ADMIN — LIDOCAINE HYDROCHLORIDE,EPINEPHRINE BITARTRATE 3 ML: 15; .005 INJECTION, SOLUTION EPIDURAL; INFILTRATION; INTRACAUDAL; PERINEURAL at 12:11

## 2020-11-17 RX ADMIN — BUPIVACAINE HYDROCHLORIDE 10 ML: 2.5 INJECTION, SOLUTION EPIDURAL; INFILTRATION; INTRACAUDAL; PERINEURAL at 10:11

## 2020-11-17 RX ADMIN — ACETAMINOPHEN 650 MG: 325 TABLET, FILM COATED ORAL at 02:11

## 2020-11-17 NOTE — ANESTHESIA PROCEDURE NOTES
Epidural    Patient location during procedure: OB   Reason for block: primary anesthetic   Diagnosis: IUP   Start time: 11/17/2020 12:10 PM  Timeout: 11/17/2020 12:05 PM  End time: 11/17/2020 12:12 PM    Staffing  Performing Provider: River Melara MD  Authorizing Provider: Samanta Jewell MD        Preanesthetic Checklist  Completed: patient identified, site marked, surgical consent, pre-op evaluation, timeout performed, IV checked, risks and benefits discussed, monitors and equipment checked, anesthesia consent given, hand hygiene performed and patient being monitored  Preparation  Patient position: sitting  Prep: ChloraPrep  Patient monitoring: Blood Pressure and Pulse Ox  Epidural  Skin Anesthetic: lidocaine 1%  Skin Wheal: 3 mL  Administration type: single shot  Approach: midline  Interspace: L3-4    Injection technique: SELINA saline  Needle and Epidural Catheter  Needle type: Tuohy   Needle gauge: 17  Needle length: 3.5 inches  Needle insertion depth: 5 cm  Catheter type: springwound and multi-orifice  Catheter size: 19 G  Catheter at skin depth: 9 cm  Test dose: 3 mL of lidocaine 1.5% with Epi 1-to-200,000  Additional Documentation: incremental injection, negative aspiration for heme and CSF, no paresthesia on injection, no signs/symptoms of IV or SA injection, no significant pain on injection and no significant complaints from patient  Needle localization: anatomical landmarks  Assessment  Ease of block: easy  Patient's tolerance of the procedure: comfortable throughout block and no complaintsNo inadvertent dural puncture with Tuohy.  Dural puncture performed with spinal needle.

## 2020-11-17 NOTE — PROGRESS NOTES
"LABOR NOTE    S:  Complaints: No.  Epidural working:  yes      O: /61   Pulse 80   Temp 98.1 °F (36.7 °C)   Resp 20   Ht 5' 1" (1.549 m)   Wt 63.7 kg (140 lb 6.9 oz)   LMP 2020 (Approximate)   SpO2 100%   Breastfeeding No   BMI 26.53 kg/m²       FHT: 150, moderate variability, accels present, no decells, Category 1 - Reassuring  CTX: q 2-4  SVE: 1      ASSESSMENT:   30 y.o.  at 38w3d, TOLAC    FHT reassuring    Active Hospital Problems    Diagnosis  POA    *Normal labor [O80, Z37.9]  Not Applicable    38 weeks gestation of pregnancy [Z3A.38]  Not Applicable      Resolved Hospital Problems    Diagnosis Date Resolved POA     (vaginal birth after ) [O34.219] 2020 Unknown     1030: 480/-3 on Admit  1430: 580/-2, Pitocin Started  1630: 580/-1, Suspect SROM, IUPC Placed    PLAN:    Continue Close Maternal/Fetal Monitoring  Pitocin Augmentation per protocol  Titrate Pitocin to Adequate CTX per IUPC  Recheck 2-4 hours or PRN      Caio Renee M.D.  PGY-4 OB/GYN  [facility]      "

## 2020-11-17 NOTE — PROGRESS NOTES
"LABOR NOTE    S:  Complaints: No.  Epidural working:  yes      O: /70   Pulse 75   Temp 98.1 °F (36.7 °C)   Resp 20   Ht 5' 1" (1.549 m)   Wt 63.7 kg (140 lb 6.9 oz)   LMP 2020 (Approximate)   SpO2 100%   Breastfeeding No   BMI 26.53 kg/m²       FHT: 150, moderate variability, accels present, no decells, Category 1 - Reassuring  CTX: Irregular  SVE: /2      ASSESSMENT:   30 y.o.  at 38w3d, TOLAC    FHT reassuring    Active Hospital Problems    Diagnosis  POA    *Normal labor [O80, Z37.9]  Not Applicable    38 weeks gestation of pregnancy [Z3A.38]  Not Applicable      Resolved Hospital Problems    Diagnosis Date Resolved POA     (vaginal birth after ) [O34.219] 2020 Unknown     1030: 480/-3 on Admit  1430: 580/-2, Pitocin Started    PLAN:    Continue Close Maternal/Fetal Monitoring  Pitocin Augmentation per protocol  Recheck 2-4 hours or PRN      Caio Renee M.D.  PGY-4 OB/GYN  [facility]      "

## 2020-11-17 NOTE — ED PROVIDER NOTES
Encounter Date: 2020       History     Chief Complaint   Patient presents with    Contractions     Sena Daniel is a 30 y.o. [id number] at 38w3d presents complaining of contractions and leakage of fluid.   This IUP is complicated by h/o CS x1. Of note, she presented to [facility] yesterday with LOF and contractions and was negative for rupture and was found to be 1/70/-3 and unchanged after two hours. She desires . Consents already signed. Patient reports contractions, denies vaginal bleeding, reports LOF.   Fetal Movement: normal.          Review of patient's allergies indicates:  No Known Allergies  No past medical history on file.  Past Surgical History:   Procedure Laterality Date     SECTION      breech/tulane    GALLBLADDER SURGERY       No family history on file.  Social History     Tobacco Use    Smoking status: Former Smoker     Packs/day: 0.10     Years: 4.00     Pack years: 0.40     Quit date: 2019     Years since quittin.4    Smokeless tobacco: Never Used   Substance Use Topics    Alcohol use: Yes    Drug use: No     Review of Systems   Constitutional: Negative for chills, fatigue and fever.   Respiratory: Negative for cough, chest tightness and shortness of breath.    Cardiovascular: Negative for chest pain.   Gastrointestinal: Positive for abdominal pain. Negative for nausea and vomiting.   Genitourinary: Positive for vaginal discharge. Negative for dysuria, flank pain, pelvic pain and urgency.   Musculoskeletal: Negative for back pain.   Skin: Negative for rash.   Neurological: Negative for dizziness, numbness and headaches.       Physical Exam     Initial Vitals [20 1000]   BP Pulse Resp Temp SpO2   121/79 84 20 97.8 °F (36.6 °C) 100 %      MAP       --         Physical Exam    Vitals reviewed.  Constitutional: She appears well-developed and well-nourished. She appears distressed.   HENT:   Head: Normocephalic.   Eyes: Pupils are equal, round, and reactive to  light.   Neck: Normal range of motion.   Cardiovascular: Normal rate.   Pulmonary/Chest: Breath sounds normal. No respiratory distress.   Abdominal: Soft. She exhibits no distension. There is no abdominal tenderness. There is no rebound and no guarding.   Genitourinary:    Uterus normal.     Musculoskeletal: Normal range of motion. No tenderness or edema.   Neurological: She is alert and oriented to person, place, and time.   Skin: Skin is warm and dry.   Psychiatric: She has a normal mood and affect. Her behavior is normal. Judgment and thought content normal.     OB LABOR EXAM:   Pre-Term Labor: No.   Membranes ruptured: No.   Method: Sterile vaginal exam per MD and Sterile speculum exam per MD.   Vaginal Bleeding: none present.     Dilatation: 4.   Station: -3.   Effacement: 80%.   Amniotic Fluid Color: no fluid.   Amniotic Fluid Amount: absent.   Comments: SSE: No pooling of fluid, nitrazine negative  SVE: /-3  Bedside US: Vertex       ED Course   Obtain Fetal nonstress test (NST)    Date/Time: 2020 10:54 AM  Performed by: Katherine C Boecking, MD  Authorized by: Katherine C Boecking, MD     Nonstress Test:     Variability:  6-25 BPM    Decelerations:  None    Accelerations:  15 bpm    Baseline:  135    Uterine Irritability: No      Contractions:  Regular    Contraction Frequency:  3-4 minutes  Biophysical Profile:     Nonstress Test Interpretation: reactive      Overall Impression:  Reassuring      Labs Reviewed   CBC W/ AUTO DIFFERENTIAL   COMPREHENSIVE METABOLIC PANEL   RPR   RAPID HIV   HIV 1 / 2 ANTIBODY   HEPATITIS B SURFACE ANTIGEN   SARS-COV-2 RNA AMPLIFICATION, QUAL   TYPE AND SCREEN LABOR & DELIVERY          Imaging Results    None          Medical Decision Making:   ED Management:  1.   - History of C/S 11 years ago  - Counseled extensively in clinic and consents signed for labor after    - VSS  - PE: SVE -3, changed from this morning 1/80/-3. James regularly on the  monitor  - Admit to L&D for normal labors she has made change from this morning, gurinder regularly on monitor, and has favorable cervix on exam  - Bedside US vertex  - Anesthesia consult for epidural  - COVID pending  - Leopolds 6  - Consents signed and in chart  - Discussed with staff and in agreement   Other:   I discussed test(s) with the performing physician.              Attending Attestation:   Physician Attestation Statement for Resident:  As the supervising MD   Physician Attestation Statement: I have personally seen and examined this patient.   I agree with the above history. -:   As the supervising MD I agree with the above PE.    As the supervising MD I agree with the above treatment, course, plan, and disposition.   -: Patient evaluated and found to be stable, agree with resident's assessment and plan.  I was personally present during the critical portions of the procedure(s) performed by the resident and was immediately available in the ED to provide services and assistance as needed during the entire procedure.  I have reviewed the following: old records at this facility.                    ED Course as of e 2020   1039 NST reactive/reassuring, patient is painfully contractions. Desires TOLAC, SVE 3-4 cm, multiple visits to the [facility].  Admit to L and D.     [AR]      ED Course User Index  [AR] Ban Armijo MD            Clinical Impression:     ICD-10-CM ICD-9-CM   1. 38 weeks gestation of pregnancy  Z3A.38 V22.2   2. Uterine contractions during pregnancy  O62.2 661.20   3.  (vaginal birth after )  O34.219 654.21                      Disposition:   Disposition: Admitted  Condition: Stable     ED Disposition Condition    Send to L&D                             Katherine C Boecking, MD  Resident  20 110       Ban Armijo MD  20 1319

## 2020-11-17 NOTE — LETTER
November 20, 2020         [address]  [place] [address]  Phone: [phone]       Patient: Sena Daniel   YOB: 1990  Date of Visit: 11/20/2020    To Whom It May Concern:    Robson Daniel  was at [facility] from 11/16/20 - 11/20/20  She may return to work/school in 6 weeks with no restrictions. If you have any questions or concerns, or if I can be of further assistance, please do not hesitate to contact me.    Sincerely,    Katherine C Boecking, MD

## 2020-11-17 NOTE — H&P
HISTORY AND PHYSICAL                                                OBSTETRICS          Subjective:      Sena Daniel is a 30 y.o. [id number] at 38w3d presents complaining of contractions and leakage of fluid.   This IUP is complicated by h/o CS x1. Of note, she presented to [facility] yesterday with LOF and contractions and was negative for rupture and was found to be 1/70/-3 and unchanged after two hours. She desires . Consents already signed. Patient reports contractions, denies vaginal bleeding, reports LOF.   Fetal Movement: normal.    History of  for breech    Review of Systems   Constitutional: Negative for chills and fever.   Eyes: Negative for visual disturbance.   Respiratory: Negative for cough.    Cardiovascular: Negative for chest pain, palpitations and leg swelling.   Gastrointestinal: Negative for constipation, diarrhea and nausea.   Genitourinary: Positive for pelvic pain (contractions) and vaginal discharge (amniotic fluid). Negative for vaginal bleeding.   Musculoskeletal: Negative for arthralgias.   Integumentary:  Negative for breast mass.   Neurological: Negative for headaches.   Psychiatric/Behavioral: Negative for depression.   Breast: Negative for mass      PMHx: History reviewed. No pertinent past medical history.    PSHx:   Past Surgical History:   Procedure Laterality Date     SECTION      breech/tulane    GALLBLADDER SURGERY         All: Review of patient's allergies indicates:  No Known Allergies    Meds: (Not in a hospital admission)      SH:   Social History     Socioeconomic History    Marital status: Single     Spouse name: Not on file    Number of children: Not on file    Years of education: Not on file    Highest education level: Not on file   Occupational History    Not on file   Social Needs    Financial resource strain: Not on file    Food insecurity     Worry: Not on file     Inability: Not on file    Transportation needs     Medical: Not on  file     Non-medical: Not on file   Tobacco Use    Smoking status: Former Smoker     Packs/day: 0.10     Years: 4.00     Pack years: 0.40     Quit date: 2019     Years since quittin.4    Smokeless tobacco: Never Used   Substance and Sexual Activity    Alcohol use: Yes    Drug use: No    Sexual activity: Yes     Partners: Male   Lifestyle    Physical activity     Days per week: Not on file     Minutes per session: Not on file    Stress: Not on file   Relationships    Social connections     Talks on phone: Not on file     Gets together: Not on file     Attends Holiness service: Not on file     Active member of club or organization: Not on file     Attends meetings of clubs or organizations: Not on file     Relationship status: Not on file   Other Topics Concern    Not on file   Social History Narrative    Not on file       FH: History reviewed. No pertinent family history.    OBHx:   OB History    Para Term  AB Living   2 1 1 0 0 1   SAB TAB Ectopic Multiple Live Births   0 0 0 0 1      # Outcome Date GA Lbr Anthony/2nd Weight Sex Delivery Anes PTL Lv   2 Current            1 Term 07/10/09    M CS-Unspec   KVNG       Objective:       /79 (BP Location: Left arm, Patient Position: Lying)   Pulse 84   Temp 97.8 °F (36.6 °C) (Oral)   Resp 20   LMP 2020 (Approximate)   SpO2 100%     Vitals:    20 1000   BP: 121/79   BP Location: Left arm   Patient Position: Lying   Pulse: 84   Resp: 20   Temp: 97.8 °F (36.6 °C)   TempSrc: Oral   SpO2: 100%       General:   alert, appears stated age and cooperative, no apparent distress   HENT:  normocephalic, atraumatic   Eyes:  extraocular movements and conjunctivae normal   Neck:  supple, range of motion normal, no thyromegaly   Lungs:   no respiratory distress   Heart:   regular rate   Abdomen:  soft, non-tender, non-distended but gravid, no rebound or guarding    Extremities negative edema, negative erythema   FHT: 130, moderate BTBV,  +accels, -decels;  Cat 1 (reassuring)                 TOCO: Q 6 minutes   Presentations: cephalic by ultrasound   Cervix:     Dilation: 4    Effacement: 80    Station:  -3    Consistency: medium    Position: middle   Sterile Speculum Exam: no pooling, valsva, nitrazine neg    EFW by Leopold's: 6 pounds    Recent Growth Scan: n/a    Lab Review  Blood Type O POS  GBBS: negative  Rubella: Immune  RPR: NR  HIV: negative  HepB: negative       Assessment:        at 38w3d weeks gestation with labor    Active Hospital Problems    Diagnosis  POA    *Normal labor [O80, Z37.9]  Not Applicable    38 weeks gestation of pregnancy [Z3A.38]  Not Applicable      Resolved Hospital Problems    Diagnosis Date Resolved POA     (vaginal birth after ) [O34.219] 2020 Unknown          Plan:   Labor- desires TOLAC after  (for breech)   Risks, benefits, alternatives and possible complications have been discussed in detail with the patient.   - Consents signed and to chart  - Admit to Labor and Delivery unit   - Epidural per Anesthesia  - Draw CBC, T&S  - Notify Staff  - Recheck in 4 hrs or PRN  - EFW 6 pounds  - Maternal pelvis adequate    Mild range blood pressure  -BP: (112-140)/(68-85) 121/79  -will get pre e labs  -asx    Post-Partum Hemorrhage risk - low    Alida Wilkins MD  OBGYN PGY-2

## 2020-11-17 NOTE — ANESTHESIA PREPROCEDURE EVALUATION
2020  Sena Daniel is a 30 y.o., female with history of  presents in labor at 4cm and desires TOLAC. She would like an epidural.    OB History    Para Term  AB Living   2 1 1     1   SAB TAB Ectopic Multiple Live Births           1      # Outcome Date GA Lbr Anthony/2nd Weight Sex Delivery Anes PTL Lv   2 Current            1 Term 07/10/09    M CS-Unspec   KVNG       Anesthesia Evaluation    I have reviewed the Patient Summary Reports.    I have reviewed the Nursing Notes.    I have reviewed the Medications.     Review of Systems  Anesthesia Hx:  No problems with previous Anesthesia  Denies Family Hx of Anesthesia complications.    Cardiovascular:  Cardiovascular Normal Exercise tolerance: good     Pulmonary:  Pulmonary Normal    Neurological:  Neurology Normal      History reviewed. No pertinent past medical history.  Past Surgical History:   Procedure Laterality Date     SECTION      breech/tulane    GALLBLADDER SURGERY       Patient Active Problem List   Diagnosis    Maternal care for scar from previous  delivery    Supervision of other normal pregnancy, antepartum     (vaginal birth after )    38 weeks gestation of pregnancy         Physical Exam  General:  Well nourished    Airway/Jaw/Neck:  Airway Findings: Mouth Opening: Normal General Airway Assessment: Adult  Mallampati: II  TM Distance: Normal, at least 6 cm  Jaw/Neck Findings:  Neck ROM: Normal ROM  Neck Findings:     Eyes/Ears/Nose:  Eyes/Ears/Nose Findings:    Dental:  Dental Findings: In tact   Chest/Lungs:  Chest/Lungs Findings: Normal Respiratory Rate     Heart/Vascular:  Heart Findings: Rate: Normal        Mental Status:  Mental Status Findings:  Cooperative, Alert and Oriented       Wt Readings from Last 3 Encounters:   20 63.7 kg (140 lb 6.9 oz)   20 63.7 kg  (140 lb 6.9 oz)   10/14/20 62.4 kg (137 lb 9.1 oz)     Temp Readings from Last 3 Encounters:   11/17/20 36.6 °C (97.8 °F) (Oral)   11/17/20 36.8 °C (98.2 °F) (Oral)   11/16/20 36.8 °C (98.2 °F)     BP Readings from Last 3 Encounters:   11/17/20 121/79   11/17/20 114/75   11/16/20 117/84     Pulse Readings from Last 3 Encounters:   11/17/20 84   11/17/20 80   11/16/20 102     Lab Results   Component Value Date    WBC 8.47 11/06/2020    HGB 11.4 (L) 11/06/2020    HCT 35.4 (L) 11/06/2020    MCV 96 11/06/2020     11/06/2020         Chemistry        Component Value Date/Time     06/08/2020 1541    K 3.8 06/08/2020 1541     06/08/2020 1541    CO2 22 (L) 06/08/2020 1541    BUN 6 06/08/2020 1541    CREATININE 0.6 06/08/2020 1541    GLU 81 06/08/2020 1541        Component Value Date/Time    CALCIUM 9.5 06/08/2020 1541    ALKPHOS 81 12/28/2019 0137    AST 28 12/28/2019 0137    ALT 23 12/28/2019 0137    BILITOT 0.9 12/28/2019 0137    ESTGFRAFRICA >60 06/08/2020 1541    EGFRNONAA >60 06/08/2020 1541            Anesthesia Plan  Type of Anesthesia, risks & benefits discussed:  Anesthesia Type:  epidural, CSE, spinal  Patient's Preference:   Intra-op Monitoring Plan: standard ASA monitors  Intra-op Monitoring Plan Comments:   Post Op Pain Control Plan: per primary service following discharge from PACU  Post Op Pain Control Plan Comments:   Induction:   IV  Beta Blocker:         Informed Consent: Patient understands risks and agrees with Anesthesia plan.  Questions answered. Anesthesia consent signed with patient.  ASA Score: 2     Day of Surgery Review of History & Physical:    H&P update referred to the surgeon.         Ready For Surgery From Anesthesia Perspective.

## 2020-11-18 PROBLEM — Z37.9 NORMAL LABOR: Status: RESOLVED | Noted: 2020-11-17 | Resolved: 2020-11-18

## 2020-11-18 PROBLEM — O34.219 VBAC, DELIVERED: Status: ACTIVE | Noted: 2020-11-18

## 2020-11-18 PROCEDURE — 72100003 HC LABOR CARE, EA. ADDL. 8 HRS

## 2020-11-18 PROCEDURE — 59612 PR VAG DELIV ONLY,PREV C-SECTN: ICD-10-PCS | Mod: AT,,, | Performed by: OBSTETRICS & GYNECOLOGY

## 2020-11-18 PROCEDURE — 62326 NJX INTERLAMINAR LMBR/SAC: CPT | Performed by: STUDENT IN AN ORGANIZED HEALTH CARE EDUCATION/TRAINING PROGRAM

## 2020-11-18 PROCEDURE — 51702 INSERT TEMP BLADDER CATH: CPT

## 2020-11-18 PROCEDURE — 63600175 PHARM REV CODE 636 W HCPCS: Performed by: STUDENT IN AN ORGANIZED HEALTH CARE EDUCATION/TRAINING PROGRAM

## 2020-11-18 PROCEDURE — 72200005 HC VAGINAL DELIVERY LEVEL II

## 2020-11-18 PROCEDURE — 25000003 PHARM REV CODE 250: Performed by: STUDENT IN AN ORGANIZED HEALTH CARE EDUCATION/TRAINING PROGRAM

## 2020-11-18 PROCEDURE — 25000003 PHARM REV CODE 250

## 2020-11-18 PROCEDURE — 25000003 PHARM REV CODE 250: Performed by: OBSTETRICS & GYNECOLOGY

## 2020-11-18 PROCEDURE — 11000001 HC ACUTE MED/SURG PRIVATE ROOM

## 2020-11-18 PROCEDURE — 72100002 HC LABOR CARE, 1ST 8 HOURS

## 2020-11-18 RX ORDER — IBUPROFEN 600 MG/1
600 TABLET ORAL EVERY 6 HOURS PRN
Qty: 30 TABLET | Refills: 1 | Status: SHIPPED | OUTPATIENT
Start: 2020-11-18

## 2020-11-18 RX ORDER — ACETAMINOPHEN 325 MG/1
650 TABLET ORAL EVERY 6 HOURS PRN
Status: DISCONTINUED | OUTPATIENT
Start: 2020-11-18 | End: 2020-11-20 | Stop reason: HOSPADM

## 2020-11-18 RX ORDER — BUPIVACAINE HYDROCHLORIDE 2.5 MG/ML
INJECTION, SOLUTION EPIDURAL; INFILTRATION; INTRACAUDAL
Status: DISCONTINUED | OUTPATIENT
Start: 2020-11-17 | End: 2020-11-18

## 2020-11-18 RX ORDER — HYDROCODONE BITARTRATE AND ACETAMINOPHEN 5; 325 MG/1; MG/1
1 TABLET ORAL EVERY 4 HOURS PRN
Status: DISCONTINUED | OUTPATIENT
Start: 2020-11-18 | End: 2020-11-20 | Stop reason: HOSPADM

## 2020-11-18 RX ORDER — PRENATAL WITH FERROUS FUM AND FOLIC ACID 3080; 920; 120; 400; 22; 1.84; 3; 20; 10; 1; 12; 200; 27; 25; 2 [IU]/1; [IU]/1; MG/1; [IU]/1; MG/1; MG/1; MG/1; MG/1; MG/1; MG/1; UG/1; MG/1; MG/1; MG/1; MG/1
1 TABLET ORAL DAILY
Status: DISCONTINUED | OUTPATIENT
Start: 2020-11-18 | End: 2020-11-20 | Stop reason: HOSPADM

## 2020-11-18 RX ORDER — LIDOCAINE HYDROCHLORIDE 10 MG/ML
INJECTION INFILTRATION; PERINEURAL
Status: COMPLETED
Start: 2020-11-18 | End: 2020-11-18

## 2020-11-18 RX ORDER — FENTANYL CITRATE 50 UG/ML
INJECTION, SOLUTION INTRAMUSCULAR; INTRAVENOUS
Status: DISCONTINUED | OUTPATIENT
Start: 2020-11-17 | End: 2020-11-18

## 2020-11-18 RX ORDER — HYDROCODONE BITARTRATE AND ACETAMINOPHEN 10; 325 MG/1; MG/1
1 TABLET ORAL EVERY 4 HOURS PRN
Status: DISCONTINUED | OUTPATIENT
Start: 2020-11-18 | End: 2020-11-20 | Stop reason: HOSPADM

## 2020-11-18 RX ORDER — DOCUSATE SODIUM 100 MG/1
200 CAPSULE, LIQUID FILLED ORAL 2 TIMES DAILY PRN
Status: DISCONTINUED | OUTPATIENT
Start: 2020-11-18 | End: 2020-11-20 | Stop reason: HOSPADM

## 2020-11-18 RX ORDER — ONDANSETRON 8 MG/1
8 TABLET, ORALLY DISINTEGRATING ORAL EVERY 8 HOURS PRN
Status: DISCONTINUED | OUTPATIENT
Start: 2020-11-18 | End: 2020-11-20 | Stop reason: HOSPADM

## 2020-11-18 RX ORDER — OXYTOCIN/RINGER'S LACTATE 30/500 ML
95 PLASTIC BAG, INJECTION (ML) INTRAVENOUS ONCE
Status: DISCONTINUED | OUTPATIENT
Start: 2020-11-18 | End: 2020-11-19

## 2020-11-18 RX ORDER — MISOPROSTOL 200 UG/1
TABLET ORAL
Status: DISCONTINUED
Start: 2020-11-18 | End: 2020-11-18 | Stop reason: WASHOUT

## 2020-11-18 RX ORDER — ACETAMINOPHEN 500 MG
1000 TABLET ORAL ONCE
Status: COMPLETED | OUTPATIENT
Start: 2020-11-18 | End: 2020-11-18

## 2020-11-18 RX ORDER — DOCUSATE SODIUM 100 MG/1
200 CAPSULE, LIQUID FILLED ORAL 2 TIMES DAILY
Qty: 30 CAPSULE | Refills: 0 | Status: SHIPPED | OUTPATIENT
Start: 2020-11-18

## 2020-11-18 RX ORDER — DIPHENHYDRAMINE HCL 25 MG
25 CAPSULE ORAL EVERY 4 HOURS PRN
Status: DISCONTINUED | OUTPATIENT
Start: 2020-11-18 | End: 2020-11-20 | Stop reason: HOSPADM

## 2020-11-18 RX ORDER — IBUPROFEN 600 MG/1
600 TABLET ORAL EVERY 6 HOURS
Status: DISCONTINUED | OUTPATIENT
Start: 2020-11-18 | End: 2020-11-20 | Stop reason: HOSPADM

## 2020-11-18 RX ORDER — DIPHENHYDRAMINE HYDROCHLORIDE 50 MG/ML
25 INJECTION INTRAMUSCULAR; INTRAVENOUS EVERY 4 HOURS PRN
Status: DISCONTINUED | OUTPATIENT
Start: 2020-11-18 | End: 2020-11-20 | Stop reason: HOSPADM

## 2020-11-18 RX ORDER — BUPIVACAINE HYDROCHLORIDE 2.5 MG/ML
INJECTION, SOLUTION EPIDURAL; INFILTRATION; INTRACAUDAL
Status: COMPLETED
Start: 2020-11-18 | End: 2020-11-18

## 2020-11-18 RX ORDER — SIMETHICONE 80 MG
1 TABLET,CHEWABLE ORAL EVERY 6 HOURS PRN
Status: DISCONTINUED | OUTPATIENT
Start: 2020-11-18 | End: 2020-11-20 | Stop reason: HOSPADM

## 2020-11-18 RX ORDER — HYDROCORTISONE 25 MG/G
CREAM TOPICAL 3 TIMES DAILY PRN
Status: DISCONTINUED | OUTPATIENT
Start: 2020-11-18 | End: 2020-11-20 | Stop reason: HOSPADM

## 2020-11-18 RX ORDER — FENTANYL CITRATE 50 UG/ML
INJECTION, SOLUTION INTRAMUSCULAR; INTRAVENOUS
Status: COMPLETED
Start: 2020-11-18 | End: 2020-11-18

## 2020-11-18 RX ORDER — METHYLERGONOVINE MALEATE 0.2 MG/ML
INJECTION INTRAVENOUS
Status: DISCONTINUED
Start: 2020-11-18 | End: 2020-11-18 | Stop reason: WASHOUT

## 2020-11-18 RX ORDER — CARBOPROST TROMETHAMINE 250 UG/ML
INJECTION, SOLUTION INTRAMUSCULAR
Status: COMPLETED
Start: 2020-11-18 | End: 2020-11-18

## 2020-11-18 RX ORDER — DIPHENOXYLATE HYDROCHLORIDE AND ATROPINE SULFATE 2.5; .025 MG/1; MG/1
2 TABLET ORAL 4 TIMES DAILY PRN
Status: DISCONTINUED | OUTPATIENT
Start: 2020-11-18 | End: 2020-11-20 | Stop reason: HOSPADM

## 2020-11-18 RX ADMIN — Medication 250 ML: at 12:11

## 2020-11-18 RX ADMIN — IBUPROFEN 600 MG: 600 TABLET, FILM COATED ORAL at 06:11

## 2020-11-18 RX ADMIN — LIDOCAINE HYDROCHLORIDE 200 MG: 10 INJECTION, SOLUTION INFILTRATION; PERINEURAL at 10:11

## 2020-11-18 RX ADMIN — SODIUM CHLORIDE, SODIUM LACTATE, POTASSIUM CHLORIDE, AND CALCIUM CHLORIDE: .6; .31; .03; .02 INJECTION, SOLUTION INTRAVENOUS at 12:11

## 2020-11-18 RX ADMIN — HYDROCODONE BITARTRATE AND ACETAMINOPHEN 1 TABLET: 10; 325 TABLET ORAL at 04:11

## 2020-11-18 RX ADMIN — Medication 95 MILLI-UNITS/MIN: at 10:11

## 2020-11-18 RX ADMIN — FENTANYL CITRATE 100 MCG: 50 INJECTION, SOLUTION INTRAMUSCULAR; INTRAVENOUS at 03:11

## 2020-11-18 RX ADMIN — ACETAMINOPHEN 1000 MG: 500 TABLET, COATED ORAL at 01:11

## 2020-11-18 RX ADMIN — CARBOPROST TROMETHAMINE 250 MCG: 250 INJECTION, SOLUTION INTRAMUSCULAR at 09:11

## 2020-11-18 RX ADMIN — IBUPROFEN 600 MG: 600 TABLET, FILM COATED ORAL at 11:11

## 2020-11-18 RX ADMIN — Medication 334 MILLI-UNITS/MIN: at 09:11

## 2020-11-18 RX ADMIN — DIPHENOXYLATE HYDROCHLORIDE AND ATROPINE SULFATE 2 TABLET: 2.5; .025 TABLET ORAL at 10:11

## 2020-11-18 RX ADMIN — MAGNESIUM SULFATE IN WATER 2 G/HR: 40 INJECTION, SOLUTION INTRAVENOUS at 11:11

## 2020-11-18 RX ADMIN — BUPIVACAINE HYDROCHLORIDE 10 ML: 2.5 INJECTION, SOLUTION EPIDURAL; INFILTRATION; INTRACAUDAL; PERINEURAL at 03:11

## 2020-11-18 RX ADMIN — HYDROCODONE BITARTRATE AND ACETAMINOPHEN 1 TABLET: 10; 325 TABLET ORAL at 11:11

## 2020-11-18 NOTE — PROGRESS NOTES
"LABOR NOTE    S:  Complaints: No.  Epidural working:  No - anesthesia called to bedside     O: /73   Pulse 107   Temp 98.1 °F (36.7 °C)   Resp 20   Ht 5' 1" (1.549 m)   Wt 63.7 kg (140 lb 6.9 oz)   LMP 2020 (Approximate)   SpO2 100%   Breastfeeding No   BMI 26.53 kg/m²     FHT: 150, moderate variability, accels present, no decels, Category 1 - Reassuring  CTX: q 2-5  SVE: /0  EXT: Reflexes 2+  RESP: Lungs CTA bilaterally    ASSESSMENT:   30 y.o.  at 38w3d, TOLAC, FHT reassuring    Active Hospital Problems    Diagnosis  POA    *Normal labor [O80, Z37.9]  Not Applicable    38 weeks gestation of pregnancy [Z3A.38]  Not Applicable      Resolved Hospital Problems    Diagnosis Date Resolved POA     (vaginal birth after ) [O34.219] 2020 Unknown     TIMELINE:  1030: /-3 on Admit  1430: /-2, Pitocin Started  1630: 5/-1, Suspect SROM, IUPC Placed  - LFTs found to be elevated, patient counseled about dx of GHTN with SF (final diagnosis pending PC ratio.   1830: 80/-1, unchanged, ctx not adequate  2100: /0    PLAN:    1. IOL  Continue Close Maternal/Fetal Monitoring  Pitocin Augmentation per protocol  Titrate Pitocin to Adequate CTX per IUPC  If recurrent late decelerations, low threshold for  delivery given h/o CS  Recheck 2 hours or PRN    2.  gHTN w/ SF (elevated LFTs)  - Final dx to be determined when PC results - requested again that PC be sent by RN and that UOP be charted  - Patient and her  counseled using the language line on her dx and prophylactic mg. All questions were answered  - Will start Mag  - Mag checks q4-6 hours    Earle Lyon M.D., PGY4  Obstetrics & Gynecology       "

## 2020-11-18 NOTE — L&D DELIVERY NOTE
[facility]  Vaginal Delivery   Operative Note    SUMMARY     Normal spontaneous vaginal delivery of live infant, skin to skin was unable to be performed due to thick meconium at time of delivery..  Infant delivered position OA over intact perineum.  Nuchal cord: No.    Spontaneous delivery of placenta and IV pitocin given noting good uterine tone with bleeding. 250 mcg of hemabate IM given noting good hemostasis. The cervix was examined and found to have no lacerations.   Right labial and right clitoral mustafa laceration noted repaird with 3-0 vicryl with simple interrupted sutures..  Patient tolerated delivery well. Sponge needle and lap counted correctly x2.    Indications: , delivered  Pregnancy complicated by:   Patient Active Problem List   Diagnosis    Maternal care for scar from previous  delivery    Supervision of other normal pregnancy, antepartum    38 weeks gestation of pregnancy    , delivered     Admitting GA: 38w4d    Delivery Information for Susana Daniel    Birth information:  YOB: 2021   Time of birth: 9:30 AM   Sex: female   Head Delivery Date/Time: 2020  9:30 AM   Delivery type: Vaginal, Spontaneous   Gestational Age: 38w4d    Delivery Providers    Delivering clinician: ALICE Hannah MD   Provider Role    Katherine C Boecking, MD Resident    Bridgett Munroe, RN Registered Nurse    Bridgett Burnett, RN Registered Nurse    Chelsey Person, RN Registered Nurse    Jordyn Murray Surgical Tech            Measurements    Weight: 3020 g  Length: 50.2 cm  Head circumference: 34.3 cm  Chest circumference: 33.7 cm         Apgars    Living status: Living  Apgars:  1 min.:  5 min.:  10 min.:  15 min.:  20 min.:    Skin color:  1  1       Heart rate:  2  2       Reflex irritability:  2  2       Muscle tone:  2  2       Respiratory effort:  2  2       Total:  9  9       Apgars assigned by: PARMJIT PERSON         Operative Delivery    Forceps  attempted?: No  Vacuum extractor attempted?: No         Shoulder Dystocia    Shoulder dystocia present?: No           Presentation    Presentation: Vertex  Position: Left Occiput Posterior           Interventions/Resuscitation    Method: Deep Suctioning, Bulb Suctioning, Tactile Stimulation       Cord    Vessels: 3 vessels  Complications: None  Delayed Cord Clamping?: No  Cord Clamped Date/Time: 2020  9:30 AM  Cord Blood Disposition: Sent with Baby  Gases Sent?: No  Stem Cell Collection (by MD): No       Placenta    Placenta delivery date/time: 2020 0935  Placenta removal: Spontaneous  Placenta appearance: Intact  Placenta disposition: discarded           Labor Events:       labor: No     Labor Onset Date/Time: 2020 12:00     Dilation Complete Date/Time: 2020 08:34     Start Pushing Date/Time: 2020 08:43       Start Pushing Date/Time: 2020 08:43     Rupture Date/Time: 20  1200         Rupture type:          Fluid Amount:       Fluid Color: Clear      Fluid Odor:       Membrane Status: SRM (Spontaneous Rupture)               steroids: None     Antibiotics given for GBS: No     Induction:       Indications for induction:        Augmentation: oxytocin     Indications for augmentation:       Labor complications: None     Additional complications:          Cervical ripening:                     Delivery:      Episiotomy: None     Indication for Episiotomy:       Perineal Lacerations: None Repaired:      Periurethral Laceration:   Repaired:     Labial Laceration: left Repaired: Yes   Sulcus Laceration:   Repaired:     Vaginal Laceration:   Repaired:     Cervical Laceration:   Repaired:     Repair suture:       Repair # of packets: 2     Last Value - EBL - Nursing (mL): 350     Sum - EBL - Nursing (mL): 350     Last Value - EBL - Anesthesia (mL):      Calculated QBL (mL):       Vaginal Sweep Performed: Yes     Surgicount Correct: Yes       Other providers:        Anesthesia    Method: Local, Epidural          Details (if applicable):  Trial of Labor      Categorization:      Priority:     Indications for :     Incision Type:       Additional  information:  Forceps:    Vacuum:    Breech:    Observed anomalies    Other (Comments): Unable to calculate QBL--scale in LDR3 is missing power cord.        Katherine Boecking MD   Ob/Gyn PGY 1

## 2020-11-18 NOTE — PROGRESS NOTES
"LABOR NOTE    S:  Complaints: No.  Epidural working: Intermittently - pushing button    O: /74   Pulse (!) 120   Temp 98.7 °F (37.1 °C) (Oral)   Resp 18   Ht 5' 1" (1.549 m)   Wt 63.7 kg (140 lb 6.9 oz)   LMP 2020 (Approximate)   SpO2 99%   Breastfeeding No   BMI 26.53 kg/m²     FHT: 155-160, moderate variability, accels present, no decels, Category 1 - Reassuring  CTX: q 2-4  SVE: /0  EXT: Reflexes 2+  RESP: Lungs CTA bilaterally    ASSESSMENT:   30 y.o.  at 38w3d, TOLAC, FHT reassuring    Active Hospital Problems    Diagnosis  POA    *Normal labor [O80, Z37.9]  Not Applicable    38 weeks gestation of pregnancy [Z3A.38]  Not Applicable      Resolved Hospital Problems    Diagnosis Date Resolved POA     (vaginal birth after ) [O34.219] 2020 Unknown     TIMELINE:  1030: /-3 on Admit  1430: /-2, Pitocin Started  1630: /-1, Suspect SROM, IUPC Placed  - LFTs found to be elevated, patient counseled about dx of GHTN with SF (final diagnosis pending PC ratio.   1830: 80/-1, unchanged, ctx not adequate  2100: /0  0000: /0, pit at 20, epidural replaced and working now  0200: No change. decreased pit 2/2 high baseline uterine tone    PLAN:    1. IOL  - Maternal tachycardia 110-120- still afebrile  - Continue Close Maternal/Fetal Monitoring  - Pitocin Augmentation per protocol  - Titrate Pitocin to Adequate CTX per IUPC  - If recurrent late decelerations, low threshold for  delivery given h/o CS  - Recheck 2 hours or PRN    2.  gHTN w/ SF (elevated LFTs)  - On Mg ppx  - No s/sx toxicity  - Mag checks q4-6 hours    Earle Lyon M.D., PGY4  Obstetrics & Gynecology  "

## 2020-11-18 NOTE — LACTATION NOTE
Baby Led Bottle Feeding education    Wash your hands.   Feed Baby on cue, not on a schedule. Babies give cues when ready to feed. Cues are soft sounds like grunts, moving arms and leg, licking lips, turning head to the side with an open mouth, and sucking hands/fingers.   Hold baby skin to skin during feedings. Look into babys eyes, talk to baby, and stroke baby while baby suckles.   Baby should be fed 8 or more times a day depending on babys cues. Some babies may be sleepy and may need to be awakened for their feeds to get the 8 feeds a day needed.   Hold the baby close while feeding and never prop a bottle.   Hold baby upright supporting head and neck.   Place the tip of nipple below babys nose, rubbing top lip and allow baby to open mouth and accept the nipple.   Hold the bottle horizontally, (level with the ground), tilt the bottle just enough to get milk in the nipple.   Watch for stress cues during feeding. Be alert for baby wrinkling eyebrow, baby turning head away from bottle, or getting fussy. Baby may need a break.   Once baby releases nipple or pulls away, do not force baby to finish bottle. Baby is full when sucks slow or stops, arms relax, turns away from nipple, pushes away or falls asleep.   Pace the feeding, feed slowly so that baby is given 15-20 minutes with breaks to burp every 10-15mls.   Alternate arms part way through feeding to allow stimulation to both babys eyes.   Use formula within one hour of starting feeding. Throw away left over formula.    Father able to demonstrate baby led bottlefeeding

## 2020-11-18 NOTE — ANESTHESIA PROCEDURE NOTES
Epidural    Patient location during procedure: OB   Reason for block: primary anesthetic   Start time: 11/17/2020 11:35 PM  Timeout: 11/17/2020 11:35 PM  End time: 11/17/2020 11:45 PM  Surgery related to: Vaginal Delivery    Staffing  Performing Provider: Ashkan Colbert MD  Authorizing Provider: Yenny Villavicencio MD        Preanesthetic Checklist  Completed: patient identified, site marked, surgical consent, pre-op evaluation, timeout performed, IV checked, risks and benefits discussed, monitors and equipment checked, anesthesia consent given, hand hygiene performed and patient being monitored  Preparation  Patient position: sitting  Prep: ChloraPrep  Patient monitoring: Pulse Ox  Epidural  Skin Anesthetic: lidocaine 1%  Skin Wheal: 3 mL  Administration type: continuous  Approach: midline  Interspace: L3-4    Injection technique: SELINA saline  Needle and Epidural Catheter  Needle type: Tuohy   Needle gauge: 17  Needle length: 3.5 inches  Needle insertion depth: 4.5 cm  Catheter type: springwound  Catheter size: 19 G  Catheter at skin depth: 10 cm  Test dose: 3 mL of lidocaine 1.5% with Epi 1-to-200,000  Additional Documentation: incremental injection, negative aspiration for heme and CSF, no paresthesia on injection, no signs/symptoms of IV or SA injection, no significant pain on injection and no significant complaints from patient  Needle localization: anatomical landmarks  Medications:  Volume per aspiration: 5 mL  Time between aspirations: 5 minutes  Assessment  Ease of block: easy  Patient's tolerance of the procedure: comfortable throughout block and no complaintsNo inadvertent dural puncture with Tuohy.  Dural puncture performed with spinal needle.

## 2020-11-18 NOTE — PLAN OF CARE
20 0842   OB SCREEN   Source of Information health record   Received Prenatal Care Yes   Any indications/suspicions for None   Is this a teen pregnancy No   Indication for adoption/[facility] No   Indication for DME/post-acute needs No   HIV (+) No   Any congenital  disorders No   Fetal demise/ death No       This patient has been screened for Social Work discharge planning needs. Based on  documentation in medical record , no discharge planning needs are anticipated at this time. Should any discharge planning needs arise, please consult . For urgent needs/consults, contact the  listed below at the number provided.     Amrita Fierro LCSW    [facility]  [email or web address]    (phone) [phone] or  Fqq. [phone]  (fax) [phone]

## 2020-11-18 NOTE — PLAN OF CARE
Problem:  Fall Injury Risk  Goal: Absence of Fall, Infant Drop and Related Injury  Outcome: Ongoing, Progressing     Problem: Adult Inpatient Plan of Care  Goal: Plan of Care Review  Outcome: Ongoing, Progressing  Goal: Patient-Specific Goal (Individualization)  Outcome: Ongoing, Progressing  Goal: Absence of Hospital-Acquired Illness or Injury  Outcome: Ongoing, Progressing  Goal: Optimal Comfort and Wellbeing  Outcome: Ongoing, Progressing  Goal: Readiness for Transition of Care  Outcome: Ongoing, Progressing  Goal: Rounds/Family Conference  Outcome: Ongoing, Progressing     Problem: Infection  Goal: Infection Symptom Resolution  Outcome: Ongoing, Progressing     Problem: Skin Injury Risk Increased  Goal: Skin Health and Integrity  Outcome: Ongoing, Progressing     Ptn on Magnesium, VSS. I&O recording. Will continue to monitor.

## 2020-11-18 NOTE — PROGRESS NOTES
"LABOR NOTE    S:  Complaints: No.  Epidural working:  yes     O: /70   Pulse 83   Temp 98.1 °F (36.7 °C)   Resp 20   Ht 5' 1" (1.549 m)   Wt 63.7 kg (140 lb 6.9 oz)   LMP 2020 (Approximate)   SpO2 100%   Breastfeeding No   BMI 26.53 kg/m²     FHT: 150, moderate variability, accels present, + late decels, Category 2 - Overall Reassuring  CTX: q 2-5  SVE: /-1  EXT: Reflexes 2+  RESP: Lungs CTA bilaterally    ASSESSMENT:   30 y.o.  at 38w3d, TOLAC, FHT reassuring    Active Hospital Problems    Diagnosis  POA    *Normal labor [O80, Z37.9]  Not Applicable    38 weeks gestation of pregnancy [Z3A.38]  Not Applicable      Resolved Hospital Problems    Diagnosis Date Resolved POA     (vaginal birth after ) [O34.219] 2020 Unknown     TIMELINE:  1030: /-3 on Admit  1430: 80/-2, Pitocin Started  1630: 80/-1, Suspect SROM, IUPC Placed  - LFTs found to be elevated, patient counseled about dx of GHTN with SF (final diagnosis pending PC ratio.   1830: /-1, unchanged, ctx not adequate    PLAN:    1. IOL  Continue Close Maternal/Fetal Monitoring  Pitocin Augmentation per protocol  Titrate Pitocin to Adequate CTX per IUPC  If recurrent late decelerations, low threshold for  delivery given h/o CS  Recheck 2-4 hours or PRN    2.  gHTN w/ SF (elevated LFTs)  - Final dx to be determined when PC results - requested again that PC be sent by RN and that UOP be charted  - Patient and her  counseled using the language line on her dx and prophylactic mg. All questions were answered  - Will start Mag  - Mag checks q4-6 hours    Earle Lyon M.D., PGY4  Obstetrics & Gynecology       "

## 2020-11-18 NOTE — PROGRESS NOTES
"LABOR NOTE    S:  Complaints: No.  Epidural working: Intermittently - pushing button    O: /85   Pulse (!) 119   Temp 98.8 °F (37.1 °C) (Oral)   Resp 18   Ht 5' 1" (1.549 m)   Wt 63.7 kg (140 lb 6.9 oz)   LMP 2020 (Approximate)   SpO2 98%   Breastfeeding No   BMI 26.53 kg/m²     FHT: 155-160, minimal to moderate variability, no late decels,Category 2, Overall remains Reassuring  CTX: irregular  SVE:     ASSESSMENT:   30 y.o.  at 38w3d, TOLAC, FHT reassuring    Active Hospital Problems    Diagnosis  POA    *Normal labor [O80, Z37.9]  Not Applicable    38 weeks gestation of pregnancy [Z3A.38]  Not Applicable      Resolved Hospital Problems    Diagnosis Date Resolved POA     (vaginal birth after ) [O34.219] 2020 Unknown     TIMELINE:  1030: /-3 on Admit  1430: /-2, Pitocin Started  1630: /-1, Suspect SROM, IUPC Placed  - LFTs found to be elevated, patient counseled about dx of GHTN with SF (final diagnosis pending PC ratio.   1830: /-1, unchanged, ctx not adequate  2100: /0  0000: , pit at 20, epidural replaced and working now  0200: No change. decreased pit 2/2 high baseline uterine tone  0400: /0  0630: , no change    PLAN:    1. IOL  - Continue Close Maternal/Fetal Monitoring  - Pitocin Augmentation per protocol  - Titrate Pitocin to Adequate CTX per IUPC. Not currently adequate  - If recurrent late decelerations, low threshold for  delivery given h/o CS  - Recheck 2 hours or PRN    2.  gHTN w/ SF (elevated LFTs)  - On Mg ppx  - No s/sx toxicity  - Mag checks q4-6 hours    Caio Renee M.D.  PGY-4 OB/GYN  [facility]    "

## 2020-11-18 NOTE — PROGRESS NOTES
"LABOR NOTE    S:  Complaints: No.  Epidural working:  Yes after being replaced    O: /79   Pulse (!) 118   Temp 98.6 °F (37 °C) (Oral)   Resp 20   Ht 5' 1" (1.549 m)   Wt 63.7 kg (140 lb 6.9 oz)   LMP 2020 (Approximate)   SpO2 98%   Breastfeeding No   BMI 26.53 kg/m²     FHT: 155-160, moderate variability, accels present, no decels, Category 1 - Reassuring  CTX: q 2-4  SVE: /0  EXT: Reflexes 2+  RESP: Lungs CTA bilaterally    ASSESSMENT:   30 y.o.  at 38w3d, TOLAC, FHT reassuring    Active Hospital Problems    Diagnosis  POA    *Normal labor [O80, Z37.9]  Not Applicable    38 weeks gestation of pregnancy [Z3A.38]  Not Applicable      Resolved Hospital Problems    Diagnosis Date Resolved POA     (vaginal birth after ) [O34.219] 2020 Unknown     TIMELINE:  1030: /-3 on Admit  1430: /-2, Pitocin Started  1630: -1, Suspect SROM, IUPC Placed  - LFTs found to be elevated, patient counseled about dx of GHTN with SF (final diagnosis pending PC ratio.   1830: /-1, unchanged, ctx not adequate  2100: /0  0000: 0, pit at 20, epidural replaced and working now    PLAN:    1. IOL  Maternal tachycardia 110-120, fetal tachycardia briefly - temperature rechecked, 98.5 deg F, patient denies subjective fever. Fetal tachycardia resolved. Will continue to monitro closely.  Continue Close Maternal/Fetal Monitoring  Pitocin Augmentation per protocol  Titrate Pitocin to Adequate CTX per IUPC  If recurrent late decelerations, low threshold for  delivery given h/o CS  Recheck 2 hours or PRN    2.  gHTN w/ SF (elevated LFTs)  - On Mg ppx  - No s/sx toxicity  - Mag checks q4-6 hours    Earle Lyon M.D., PGY4  Obstetrics & Gynecology  "

## 2020-11-18 NOTE — PROGRESS NOTES
"LABOR NOTE    S:  Complaints: No.  Epidural working: Intermittently - pushing button    O: /65   Pulse (!) 125   Temp 98.7 °F (37.1 °C) (Oral)   Resp 18   Ht 5' 1" (1.549 m)   Wt 63.7 kg (140 lb 6.9 oz)   LMP 2020 (Approximate)   SpO2 100%   Breastfeeding No   BMI 26.53 kg/m²     FHT: 155-160, minimal to moderate variability, accels present, occasional variable decels, Category 2 -responded well to scalp stim - Reassuring  CTX: irregular  SVE:     ASSESSMENT:   30 y.o.  at 38w3d, TOLAC, FHT reassuring    Active Hospital Problems    Diagnosis  POA    *Normal labor [O80, Z37.9]  Not Applicable    38 weeks gestation of pregnancy [Z3A.38]  Not Applicable      Resolved Hospital Problems    Diagnosis Date Resolved POA     (vaginal birth after ) [O34.219] 2020 Unknown     TIMELINE:  1030: /-3 on Admit  1430: /-2, Pitocin Started  1630: -1, Suspect SROM, IUPC Placed  - LFTs found to be elevated, patient counseled about dx of GHTN with SF (final diagnosis pending PC ratio.   1830: /-1, unchanged, ctx not adequate  2100: 0  0000: , pit at 20, epidural replaced and working now  0200: No change. decreased pit 2/2 high baseline uterine tone  0400:     PLAN:    1. IOL  - Maternal tachycardia 110-120- still afebrile  - Continue Close Maternal/Fetal Monitoring  - Pitocin Augmentation per protocol  - Titrate Pitocin to Adequate CTX per IUPC  - If recurrent late decelerations, low threshold for  delivery given h/o CS  - Recheck 2 hours or PRN    2.  gHTN w/ SF (elevated LFTs)  - On Mg ppx  - No s/sx toxicity  - Mag checks q4-6 hours    Delmi Morgan M.D.  OBGYN PGY-2  "

## 2020-11-18 NOTE — PROGRESS NOTES
11/18/20 1700   Vital Signs   Pulse 107   SpO2 100 %   BP (!) 98/54   MAP (mmHg) 69     Dr. Morgan notified of ptn BP. Ptn asymptomatic. Dr. Morgan ok with BP.

## 2020-11-19 LAB
ALBUMIN SERPL BCP-MCNC: 1.8 G/DL (ref 3.5–5.2)
ALP SERPL-CCNC: 225 U/L (ref 55–135)
ALT SERPL W/O P-5'-P-CCNC: 46 U/L (ref 10–44)
ANION GAP SERPL CALC-SCNC: 3 MMOL/L (ref 8–16)
AST SERPL-CCNC: 28 U/L (ref 10–40)
BASOPHILS NFR BLD: 0 % (ref 0–1.9)
BILIRUB SERPL-MCNC: 0.3 MG/DL (ref 0.1–1)
BUN SERPL-MCNC: 7 MG/DL (ref 6–20)
CALCIUM SERPL-MCNC: 7 MG/DL (ref 8.7–10.5)
CHLORIDE SERPL-SCNC: 102 MMOL/L (ref 95–110)
CO2 SERPL-SCNC: 24 MMOL/L (ref 23–29)
CREAT SERPL-MCNC: 0.6 MG/DL (ref 0.5–1.4)
DIFFERENTIAL METHOD: ABNORMAL
EOSINOPHIL NFR BLD: 1 % (ref 0–8)
ERYTHROCYTE [DISTWIDTH] IN BLOOD BY AUTOMATED COUNT: 13.3 % (ref 11.5–14.5)
EST. GFR  (AFRICAN AMERICAN): >60 ML/MIN/1.73 M^2
EST. GFR  (NON AFRICAN AMERICAN): >60 ML/MIN/1.73 M^2
GLUCOSE SERPL-MCNC: 92 MG/DL (ref 70–110)
HCT VFR BLD AUTO: 23.6 % (ref 37–48.5)
HGB BLD-MCNC: 8 G/DL (ref 12–16)
IMM GRANULOCYTES # BLD AUTO: ABNORMAL K/UL (ref 0–0.04)
IMM GRANULOCYTES NFR BLD AUTO: ABNORMAL % (ref 0–0.5)
LYMPHOCYTES NFR BLD: 15 % (ref 18–48)
MCH RBC QN AUTO: 31 PG (ref 27–31)
MCHC RBC AUTO-ENTMCNC: 33.9 G/DL (ref 32–36)
MCV RBC AUTO: 92 FL (ref 82–98)
MONOCYTES NFR BLD: 2 % (ref 4–15)
NEUTROPHILS NFR BLD: 72 % (ref 38–73)
NEUTS BAND NFR BLD MANUAL: 10 %
NRBC BLD-RTO: 0 /100 WBC
PLATELET # BLD AUTO: 142 K/UL (ref 150–350)
PMV BLD AUTO: 12.3 FL (ref 9.2–12.9)
POTASSIUM SERPL-SCNC: 3.8 MMOL/L (ref 3.5–5.1)
PROT SERPL-MCNC: 4.9 G/DL (ref 6–8.4)
RBC # BLD AUTO: 2.58 M/UL (ref 4–5.4)
SODIUM SERPL-SCNC: 129 MMOL/L (ref 136–145)
WBC # BLD AUTO: 15.23 K/UL (ref 3.9–12.7)

## 2020-11-19 PROCEDURE — 99232 SBSQ HOSP IP/OBS MODERATE 35: CPT | Mod: ,,, | Performed by: OBSTETRICS & GYNECOLOGY

## 2020-11-19 PROCEDURE — 80053 COMPREHEN METABOLIC PANEL: CPT

## 2020-11-19 PROCEDURE — 63600175 PHARM REV CODE 636 W HCPCS: Performed by: STUDENT IN AN ORGANIZED HEALTH CARE EDUCATION/TRAINING PROGRAM

## 2020-11-19 PROCEDURE — 11000001 HC ACUTE MED/SURG PRIVATE ROOM

## 2020-11-19 PROCEDURE — 99232 PR SUBSEQUENT HOSPITAL CARE,LEVL II: ICD-10-PCS | Mod: ,,, | Performed by: OBSTETRICS & GYNECOLOGY

## 2020-11-19 PROCEDURE — 36415 COLL VENOUS BLD VENIPUNCTURE: CPT

## 2020-11-19 PROCEDURE — 85007 BL SMEAR W/DIFF WBC COUNT: CPT

## 2020-11-19 PROCEDURE — 85027 COMPLETE CBC AUTOMATED: CPT

## 2020-11-19 PROCEDURE — 25000003 PHARM REV CODE 250: Performed by: STUDENT IN AN ORGANIZED HEALTH CARE EDUCATION/TRAINING PROGRAM

## 2020-11-19 RX ORDER — POLYETHYLENE GLYCOL 3350 17 G/17G
17 POWDER, FOR SOLUTION ORAL DAILY
Status: DISCONTINUED | OUTPATIENT
Start: 2020-11-19 | End: 2020-11-20 | Stop reason: HOSPADM

## 2020-11-19 RX ADMIN — IBUPROFEN 600 MG: 600 TABLET, FILM COATED ORAL at 05:11

## 2020-11-19 RX ADMIN — DOCUSATE SODIUM 200 MG: 100 CAPSULE, LIQUID FILLED ORAL at 10:11

## 2020-11-19 RX ADMIN — PRENATAL VIT W/ FE FUMARATE-FA TAB 27-0.8 MG 1 TABLET: 27-0.8 TAB at 08:11

## 2020-11-19 RX ADMIN — DOCUSATE SODIUM 200 MG: 100 CAPSULE, LIQUID FILLED ORAL at 08:11

## 2020-11-19 RX ADMIN — IBUPROFEN 600 MG: 600 TABLET, FILM COATED ORAL at 12:11

## 2020-11-19 RX ADMIN — MAGNESIUM SULFATE IN WATER 2 G/HR: 40 INJECTION, SOLUTION INTRAVENOUS at 07:11

## 2020-11-19 RX ADMIN — IBUPROFEN 600 MG: 600 TABLET, FILM COATED ORAL at 11:11

## 2020-11-19 RX ADMIN — SODIUM CHLORIDE, SODIUM LACTATE, POTASSIUM CHLORIDE, AND CALCIUM CHLORIDE: .6; .31; .03; .02 INJECTION, SOLUTION INTRAVENOUS at 07:11

## 2020-11-19 NOTE — PLAN OF CARE
Problem:  Fall Injury Risk  Goal: Absence of Fall, Infant Drop and Related Injury  Outcome: Ongoing, Progressing     Problem: Adult Inpatient Plan of Care  Goal: Plan of Care Review  Outcome: Ongoing, Progressing  Goal: Patient-Specific Goal (Individualization)  Outcome: Ongoing, Progressing  Goal: Absence of Hospital-Acquired Illness or Injury  Outcome: Ongoing, Progressing  Goal: Optimal Comfort and Wellbeing  Outcome: Ongoing, Progressing  Goal: Readiness for Transition of Care  Outcome: Ongoing, Progressing  Goal: Rounds/Family Conference  Outcome: Ongoing, Progressing     Problem: Infection  Goal: Infection Symptom Resolution  Outcome: Ongoing, Progressing     Problem: Skin Injury Risk Increased  Goal: Skin Health and Integrity  Outcome: Ongoing, Progressing     DC mag around 0930. Will continue to monitor.

## 2020-11-19 NOTE — PROGRESS NOTES
POSTPARTUM PROGRESS NOTE     Sena Daniel is a 30 y.o. female PPD #1 status post  at 38w4d in a pregnancy complicated by history of  for breech presentation. On admission, she was found to have gHTN with SF (LFTs).  Patient is doing well this morning. She denies nausea, vomiting, fever or chills.  Patient reports mild abdominal pain that is adequately relieved by oral pain medications. Lochia is mild to moderate  and stable. Patient is voiding without difficulty and ambulating with no difficulty. She has passed flatus.  Patient does plan to bottlefeed. Undecided for contraception.     Objective:       Temp:  [97.7 °F (36.5 °C)-98.2 °F (36.8 °C)] 97.9 °F (36.6 °C)  Pulse:  [] 85  Resp:  [16-18] 16  SpO2:  [94 %-100 %] 94 %  BP: ()/(42-93) 103/56    General:   alert, appears stated age and cooperative   Lungs:   Non-labored respirations    Heart:   regular rate and rhythm   Abdomen:  Soft, nondistended    Uterus:  firm located at the umblicus.    Extremities: no pedal edema noted     Lab Review  Recent Labs   Lab 20  1144 20  0351   WBC 13.41* 15.23*   HGB 12.0 8.0*   HCT 35.7* 23.6*   MCV 93 92    142*        Recent Labs   Lab 20  1158 20  0351    129*   K 4.1 3.8    102   CO2 19* 24   BUN 5* 7   CREATININE 0.6 0.6   GLU 76 92   PROT 7.2 4.9*   BILITOT 0.7 0.3   ALKPHOS 384* 225*   * 46*   AST 76* 28          I/O    Intake/Output Summary (Last 24 hours) at 2020 0628  Last data filed at 2020 0600  Gross per 24 hour   Intake 5748.74 ml   Output 4750 ml   Net 998.74 ml        Assessment:     Patient Active Problem List   Diagnosis    Maternal care for scar from previous  delivery    Supervision of other normal pregnancy, antepartum    38 weeks gestation of pregnancy    , delivered        Plan:   1. Postpartum care:  - Patient doing well. Continue routine management and advances.  - Continue PO pain meds. Pain  well controlled.  - Heme: H/h 12/36  - Encourage ambulation  - Contraception: patient is still deciding, will continue to discuss  - Lactation consult PRN    2. gHTN with SF (LFTs)  -BP: ()/(42-93) 103/56  -P/C 0.28, Cr 0.6,   - AST/ALT 76/104 >> 28/46  -asymptomatic  -Mag until 24 hours postpartum    Dispo: As patient meets milestones, will plan to discharge PPD1-2.      Diogenes López M.D.  OB/GYN PGY-1

## 2020-11-19 NOTE — ANESTHESIA POSTPROCEDURE EVALUATION
Anesthesia Post Evaluation    Patient: Sena Daniel    Procedure(s) Performed: * No procedures listed *    Final Anesthesia Type: epidural    Patient location during evaluation: floor  Patient participation: Yes- Able to Participate  Level of consciousness: awake and alert and oriented  Post-procedure vital signs: reviewed and stable  Pain management: adequate  Airway patency: patent    PONV status at discharge: No PONV  Anesthetic complications: no      Cardiovascular status: blood pressure returned to baseline  Respiratory status: room air, unassisted and spontaneous ventilation  Hydration status: euvolemic  Follow-up not needed.          Vitals Value Taken Time   /71 11/19/20 1228   Temp 37.1 °C (98.8 °F) 11/19/20 1228   Pulse 114 11/19/20 1228   Resp 18 11/19/20 1228   SpO2 98 % 11/19/20 1228         No case tracking events are documented in the log.      Pain/Juan M Score: Pain Rating Prior to Med Admin: 3 (11/19/2020 11:11 AM)  Pain Rating Post Med Admin: 1 (11/19/2020 12:05 PM)

## 2020-11-20 VITALS
WEIGHT: 138 LBS | DIASTOLIC BLOOD PRESSURE: 78 MMHG | SYSTOLIC BLOOD PRESSURE: 116 MMHG | OXYGEN SATURATION: 97 % | TEMPERATURE: 98 F | HEART RATE: 87 BPM | HEIGHT: 61 IN | BODY MASS INDEX: 26.06 KG/M2 | RESPIRATION RATE: 18 BRPM

## 2020-11-20 PROCEDURE — 25000003 PHARM REV CODE 250: Performed by: STUDENT IN AN ORGANIZED HEALTH CARE EDUCATION/TRAINING PROGRAM

## 2020-11-20 PROCEDURE — 99238 PR HOSPITAL DISCHARGE DAY,<30 MIN: ICD-10-PCS | Mod: ,,, | Performed by: OBSTETRICS & GYNECOLOGY

## 2020-11-20 PROCEDURE — 99238 HOSP IP/OBS DSCHRG MGMT 30/<: CPT | Mod: ,,, | Performed by: OBSTETRICS & GYNECOLOGY

## 2020-11-20 RX ORDER — SIMETHICONE 80 MG
80 TABLET,CHEWABLE ORAL EVERY 6 HOURS PRN
Refills: 0 | COMMUNITY
Start: 2020-11-20

## 2020-11-20 RX ADMIN — PRENATAL VIT W/ FE FUMARATE-FA TAB 27-0.8 MG 1 TABLET: 27-0.8 TAB at 08:11

## 2020-11-20 RX ADMIN — IBUPROFEN 600 MG: 600 TABLET, FILM COATED ORAL at 11:11

## 2020-11-20 RX ADMIN — IBUPROFEN 600 MG: 600 TABLET, FILM COATED ORAL at 05:11

## 2020-11-20 RX ADMIN — HYDROCODONE BITARTRATE AND ACETAMINOPHEN 1 TABLET: 5; 325 TABLET ORAL at 01:11

## 2020-11-20 RX ADMIN — POLYETHYLENE GLYCOL 3350 17 G: 17 POWDER, FOR SOLUTION ORAL at 08:11

## 2020-11-20 NOTE — DISCHARGE SUMMARY
Delivery Discharge Summary  Obstetrics      Primary OB Clinician: ALICE Hannah MD      Admission date: 2020  Discharge date: 2020    Disposition: To home, self care    Discharge Diagnosis List:      Patient Active Problem List   Diagnosis    Maternal care for scar from previous  delivery    Supervision of other normal pregnancy, antepartum    38 weeks gestation of pregnancy    , delivered       Procedure:     Hospital Course:  Sena Daniel is a 30 y.o. now , PPD #2 who was admitted on 2020 at 38w3d for normal labor and . Patient was subsequently admitted to labor and delivery unit with signed consents.     Labor course was uncomplicated and resulted in  without complications.     Please see delivery note for further details. Her postpartum course was uncomplicated. On discharge day, patient's pain is controlled with oral pain medications. Pt is tolerating ambulation without SOB or CP, and regular diet without N/V. Reports lochia is mild. Denies any HA, vision changes, F/C, LE swelling. Denies any breast pain/soreness.    Pt in stable condition and ready for discharge. She has been instructed to start and/or continue medications and follow up with her obstetrics provider as listed below.    Pertinent studies:  CBC  Recent Labs   Lab 20  1144 20  0351   WBC 13.41* 15.23*   HGB 12.0 8.0*   HCT 35.7* 23.6*   MCV 93 92    142*          Immunization History   Administered Date(s) Administered    Influenza - Quadrivalent - PF *Preferred* (6 months and older) 2020    Tdap 10/14/2020        Delivery:    Episiotomy: None   Lacerations: None   Repair suture:     Repair # of packets: 2   Blood loss (ml): 350     Birth information:  YOB: 2021   Time of birth: 9:30 AM   Sex: female   Delivery type: Vaginal, Spontaneous   Gestational Age: 38w4d    Delivery Clinician:      Other providers:       Additional   information:  Forceps:    Vacuum:    Breech:    Observed anomalies      Living?:           APGARS  One minute Five minutes Ten minutes   Skin color:         Heart rate:         Grimace:         Muscle tone:         Breathing:         Totals: 9  9        Placenta: Delivered:       appearance      Patient Instructions:   Current Discharge Medication List      START taking these medications    Details   docusate sodium (COLACE) 100 MG capsule Take 2 capsules (200 mg total) by mouth 2 (two) times daily.  Qty: 30 capsule, Refills: 0      ibuprofen (ADVIL,MOTRIN) 600 MG tablet Take 1 tablet (600 mg total) by mouth every 6 (six) hours as needed for Pain.  Qty: 30 tablet, Refills: 1      lanolin Crea cream Apply topically as needed for Dry Skin (to nipples).  Qty:  , Refills: 0      simethicone (MYLICON) 80 MG chewable tablet Take 1 tablet (80 mg total) by mouth every 6 (six) hours as needed for Flatulence.  Qty:  , Refills: 0         CONTINUE these medications which have NOT CHANGED    Details   aspirin (ECOTRIN) 81 MG EC tablet Take 1 tablet (81 mg total) by mouth once daily.  Qty: 30 tablet, Refills: 8      prenatal vit,calc76/iron/folic (PNV 29-1 ORAL) Take by mouth.             Discharge Procedure Orders   Diet Adult Regular     Ice to affected area     Other restrictions (specify):   Order Comments: Do not drive while on narcotics. Do not drive until you feel comfortable placing your foot on the break without pain/discomfort.     Pelvic Rest   Order Comments: Do not put anything in your vagina until evaluated by your doctor at your next visit. This includes douching, tampons, sex.     Notify your health care provider if you experience any of the following:  temperature >100.4     Notify your health care provider if you experience any of the following:  persistent nausea and vomiting or diarrhea     Notify your health care provider if you experience any of the following:  severe uncontrolled pain     Notify your health  care provider if you experience any of the following:  redness, tenderness, or signs of infection (pain, swelling, redness, odor or green/yellow discharge around incision site)     Notify your health care provider if you experience any of the following:  difficulty breathing or increased cough     Notify your health care provider if you experience any of the following:  severe persistent headache     Notify your health care provider if you experience any of the following:  worsening rash     Notify your health care provider if you experience any of the following:  persistent dizziness, light-headedness, or visual disturbances     Notify your health care provider if you experience any of the following:  increased confusion or weakness     Remove dressing in 24 hours     Activity as tolerated       Follow-up Information     [facility] [address] In 1 week.    Specialty: Obstetrics and Gynecology  Why: BP check  Contact information:  [address], [address]  [facility] [address] [phone]           [facility] [address] In 6 weeks.    Specialty: Obstetrics and Gynecology  Why: Postpartum visit  Contact information:  [address]  [facility] [address] [phone]                  Diogenes López M.D.  OB/GYN PGY-1

## 2020-11-20 NOTE — PROGRESS NOTES
POSTPARTUM PROGRESS NOTE     Sena Daniel is a 30 y.o. female PPD #2 status post  at 38w4d in a pregnancy complicated by history of  for breech presentation. On admission, she was found to have gHTN with SF (LFTs).  Patient is doing well this morning. She denies nausea, vomiting, fever or chills.  Patient reports mild abdominal pain that is adequately relieved by oral pain medications. Lochia is mild to moderate  and stable. Patient is voiding without difficulty and ambulating with no difficulty. She has passed flatus.  Patient does plan to bottlefeed. Desires OCPs for contraception.     Objective:       Temp:  [97.9 °F (36.6 °C)-98.8 °F (37.1 °C)] 97.9 °F (36.6 °C)  Pulse:  [] 81  Resp:  [18] 18  SpO2:  [96 %-100 %] 97 %  BP: ()/(53-71) 99/54    General:   alert, appears stated age and cooperative   Lungs:   Non-labored respirations    Heart:   regular rate and rhythm   Abdomen:  Soft, nondistended    Uterus:  firm located at the umblicus.    Extremities: no pedal edema noted     Lab Review  Recent Labs   Lab 20  1144 20  0351   WBC 13.41* 15.23*   HGB 12.0 8.0*   HCT 35.7* 23.6*   MCV 93 92    142*        Recent Labs   Lab 20  1158 20  0351    129*   K 4.1 3.8    102   CO2 19* 24   BUN 5* 7   CREATININE 0.6 0.6   GLU 76 92   PROT 7.2 4.9*   BILITOT 0.7 0.3   ALKPHOS 384* 225*   * 46*   AST 76* 28          I/O    Intake/Output Summary (Last 24 hours) at 2020 0616  Last data filed at 2020 2200  Gross per 24 hour   Intake 1437.5 ml   Output 1000 ml   Net 437.5 ml        Assessment:     Patient Active Problem List   Diagnosis    Maternal care for scar from previous  delivery    Supervision of other normal pregnancy, antepartum    38 weeks gestation of pregnancy    , delivered        Plan:   1. Postpartum care:  - Patient doing well. Continue routine management and advances.  - Continue PO pain meds. Pain  well controlled.  - Heme: H/h 12/36  - Encourage ambulation  - Contraception: patient is still deciding, will continue to discuss  - Lactation consult PRN    2. gHTN with SF (LFTs)  -BP: ()/(53-71) 99/54  -P/C 0.28, Cr 0.6,   - AST/ALT 76/104 >> 28/46  -asymptomatic  -s/p Mag  - Will need 1 week BP check    Dispo: As patient meets milestones, will plan to discharge today.      Diogenes López M.D.  OB/GYN PGY-1

## 2020-11-20 NOTE — NURSING
VSS, No issues during shift.  Discharge education given to patient. Patient verbalized understanding.   BP check in 1 week.  Follow up with OB in 6 weeks.  Meds delivered to the room.  Awaiting transport for discharge. Will continue to monitor. Latrice Marinelli RN

## 2020-11-30 ENCOUNTER — POSTPARTUM VISIT (OUTPATIENT)
Dept: OBSTETRICS AND GYNECOLOGY | Facility: CLINIC | Age: 30
End: 2020-11-30
Payer: MEDICAID

## 2020-11-30 VITALS
BODY MASS INDEX: 23.58 KG/M2 | WEIGHT: 124.88 LBS | DIASTOLIC BLOOD PRESSURE: 74 MMHG | HEIGHT: 61 IN | SYSTOLIC BLOOD PRESSURE: 116 MMHG

## 2020-11-30 DIAGNOSIS — Z01.30 BP CHECK: Primary | ICD-10-CM

## 2020-11-30 PROCEDURE — 99213 OFFICE O/P EST LOW 20 MIN: CPT | Mod: 24,S$PBB,, | Performed by: ADVANCED PRACTICE MIDWIFE

## 2020-11-30 PROCEDURE — 99999 PR PBB SHADOW E&M-EST. PATIENT-LVL III: ICD-10-PCS | Mod: PBBFAC,,,

## 2020-11-30 PROCEDURE — 99213 OFFICE O/P EST LOW 20 MIN: CPT | Mod: PBBFAC

## 2020-11-30 PROCEDURE — 99999 PR PBB SHADOW E&M-EST. PATIENT-LVL III: CPT | Mod: PBBFAC,,,

## 2020-11-30 PROCEDURE — 99213 PR OFFICE/OUTPT VISIT, EST, LEVL III, 20-29 MIN: ICD-10-PCS | Mod: 24,S$PBB,, | Performed by: ADVANCED PRACTICE MIDWIFE

## 2020-11-30 NOTE — PROGRESS NOTES
"Chief Complaint: Blood Pressure Check      HPI:  30 y.o. here for BP check after   on 20. Pregnancy complicated byprevious  delivery.Pt denies complaints of HA/Vision changes/abdominal cramping/swelling/urination problems. She is not taking any BP medications at this time.   Bonding well with baby, currently breast and bottle feeding.     ROS   Systemic: Not feeling tired (fatigue).  No fever chills   Gastrointestinal: No nausea, vomiting, no abdominal pain.  No diarrhea.  Genitourinary: No dysuria. No Pelvic Pain  Skin: No rash.    /74   Ht 5' 1" (1.549 m)   Wt 56.7 kg (124 lb 14.3 oz)   LMP 2020 (Approximate)   BMI 23.60 kg/m²        PE:  Vitals: Normal and reviewed       Plan:  1. PP with Pre-E BP check--manual BP stable. .Discussed s/s pre e and need to report if occur..     RTC for pp visit in 4 weeks      "

## 2020-12-08 NOTE — ADDENDUM NOTE
Addendum  created 12/08/20 1427 by Yenny Villavicencio MD    Attestation recorded in Intraprocedure, Intraprocedure Attestations filed

## 2021-10-25 PROBLEM — Z3A.38 38 WEEKS GESTATION OF PREGNANCY: Status: RESOLVED | Noted: 2020-11-17 | Resolved: 2021-10-25

## 2022-07-26 NOTE — TELEPHONE ENCOUNTER
----- Message from Jenni Song sent at 7/30/2020 12:35 PM CDT -----      Name of Who is Calling: DALIA STOCKTON [[id number]]      What is the request in detail: Pt returned call to staff.Please contact to further discuss and advise.          Can the clinic reply by [facility]: N      What Number to Call Back if not in [facility]: [phone]                                       25-Jul-2022 23:20

## 2024-01-20 ENCOUNTER — HOSPITAL ENCOUNTER (EMERGENCY)
Facility: HOSPITAL | Age: 34
Discharge: HOME | End: 2024-01-20
Attending: STUDENT IN AN ORGANIZED HEALTH CARE EDUCATION/TRAINING PROGRAM

## 2024-01-20 VITALS
WEIGHT: 120 LBS | TEMPERATURE: 98 F | OXYGEN SATURATION: 99 % | HEIGHT: 61 IN | RESPIRATION RATE: 16 BRPM | DIASTOLIC BLOOD PRESSURE: 57 MMHG | BODY MASS INDEX: 22.66 KG/M2 | SYSTOLIC BLOOD PRESSURE: 107 MMHG | HEART RATE: 71 BPM

## 2024-01-20 DIAGNOSIS — N12 PYELONEPHRITIS: Primary | ICD-10-CM

## 2024-01-20 LAB
ALBUMIN SERPL BCP-MCNC: 4.2 G/DL (ref 3.5–5.2)
ALP SERPL-CCNC: 86 U/L (ref 55–135)
ALT SERPL W/O P-5'-P-CCNC: 18 U/L (ref 10–44)
ANION GAP SERPL CALC-SCNC: 8 MMOL/L (ref 8–16)
AST SERPL-CCNC: 17 U/L (ref 10–40)
B-HCG UR QL: NEGATIVE
BACTERIA #/AREA URNS AUTO: ABNORMAL /HPF
BASOPHILS # BLD AUTO: 0.09 K/UL (ref 0–0.2)
BASOPHILS NFR BLD: 1.2 % (ref 0–1.9)
BILIRUB SERPL-MCNC: 0.3 MG/DL (ref 0.1–1)
BILIRUB UR QL STRIP: NEGATIVE
BUN SERPL-MCNC: 7 MG/DL (ref 6–20)
CALCIUM SERPL-MCNC: 9.7 MG/DL (ref 8.7–10.5)
CHLORIDE SERPL-SCNC: 107 MMOL/L (ref 95–110)
CLARITY UR REFRACT.AUTO: CLEAR
CO2 SERPL-SCNC: 23 MMOL/L (ref 23–29)
COLOR UR AUTO: ABNORMAL
CREAT SERPL-MCNC: 0.7 MG/DL (ref 0.5–1.4)
CTP QC/QA: YES
DIFFERENTIAL METHOD BLD: ABNORMAL
EOSINOPHIL # BLD AUTO: 0.1 K/UL (ref 0–0.5)
EOSINOPHIL NFR BLD: 1.8 % (ref 0–8)
ERYTHROCYTE [DISTWIDTH] IN BLOOD BY AUTOMATED COUNT: 12 % (ref 11.5–14.5)
EST. GFR  (NO RACE VARIABLE): >60 ML/MIN/1.73 M^2
GLUCOSE SERPL-MCNC: 96 MG/DL (ref 70–110)
GLUCOSE UR QL STRIP: NEGATIVE
HCT VFR BLD AUTO: 40.9 % (ref 37–48.5)
HGB BLD-MCNC: 13.4 G/DL (ref 12–16)
HGB UR QL STRIP: NEGATIVE
IMM GRANULOCYTES # BLD AUTO: 0.02 K/UL (ref 0–0.04)
IMM GRANULOCYTES NFR BLD AUTO: 0.3 % (ref 0–0.5)
KETONES UR QL STRIP: NEGATIVE
LEUKOCYTE ESTERASE UR QL STRIP: ABNORMAL
LYMPHOCYTES # BLD AUTO: 2.8 K/UL (ref 1–4.8)
LYMPHOCYTES NFR BLD: 36.5 % (ref 18–48)
MCH RBC QN AUTO: 30.9 PG (ref 27–31)
MCHC RBC AUTO-ENTMCNC: 32.8 G/DL (ref 32–36)
MCV RBC AUTO: 94 FL (ref 82–98)
MICROSCOPIC COMMENT: ABNORMAL
MONOCYTES # BLD AUTO: 0.5 K/UL (ref 0.3–1)
MONOCYTES NFR BLD: 6.8 % (ref 4–15)
NEUTROPHILS # BLD AUTO: 4.1 K/UL (ref 1.8–7.7)
NEUTROPHILS NFR BLD: 53.4 % (ref 38–73)
NITRITE UR QL STRIP: NEGATIVE
NRBC BLD-RTO: 0 /100 WBC
PH UR STRIP: 6 [PH] (ref 5–8)
PLATELET # BLD AUTO: 202 K/UL (ref 150–450)
PLATELET BLD QL SMEAR: ABNORMAL
PMV BLD AUTO: 13 FL (ref 9.2–12.9)
POTASSIUM SERPL-SCNC: 4.4 MMOL/L (ref 3.5–5.1)
PROT SERPL-MCNC: 7.5 G/DL (ref 6–8.4)
PROT UR QL STRIP: NEGATIVE
RBC # BLD AUTO: 4.34 M/UL (ref 4–5.4)
RBC #/AREA URNS AUTO: 2 /HPF (ref 0–4)
SODIUM SERPL-SCNC: 138 MMOL/L (ref 136–145)
SP GR UR STRIP: 1.01 (ref 1–1.03)
SQUAMOUS #/AREA URNS AUTO: 1 /HPF
URN SPEC COLLECT METH UR: ABNORMAL
WBC # BLD AUTO: 7.61 K/UL (ref 3.9–12.7)
WBC #/AREA URNS AUTO: 8 /HPF (ref 0–5)
WBC TOXIC VACUOLES BLD QL SMEAR: PRESENT

## 2024-01-20 PROCEDURE — 63600175 PHARM REV CODE 636 W HCPCS: Performed by: PHYSICIAN ASSISTANT

## 2024-01-20 PROCEDURE — 85025 COMPLETE CBC W/AUTO DIFF WBC: CPT | Performed by: PHYSICIAN ASSISTANT

## 2024-01-20 PROCEDURE — 96375 TX/PRO/DX INJ NEW DRUG ADDON: CPT

## 2024-01-20 PROCEDURE — 25000003 PHARM REV CODE 250: Performed by: PHYSICIAN ASSISTANT

## 2024-01-20 PROCEDURE — 96365 THER/PROPH/DIAG IV INF INIT: CPT

## 2024-01-20 PROCEDURE — 81025 URINE PREGNANCY TEST: CPT

## 2024-01-20 PROCEDURE — 80053 COMPREHEN METABOLIC PANEL: CPT | Performed by: PHYSICIAN ASSISTANT

## 2024-01-20 PROCEDURE — 99285 EMERGENCY DEPT VISIT HI MDM: CPT | Mod: 25

## 2024-01-20 PROCEDURE — 81001 URINALYSIS AUTO W/SCOPE: CPT

## 2024-01-20 RX ORDER — CEPHALEXIN 500 MG/1
500 CAPSULE ORAL EVERY 12 HOURS
Qty: 20 CAPSULE | Refills: 0 | Status: SHIPPED | OUTPATIENT
Start: 2024-01-20 | End: 2024-01-30

## 2024-01-20 RX ORDER — KETOROLAC TROMETHAMINE 30 MG/ML
10 INJECTION, SOLUTION INTRAMUSCULAR; INTRAVENOUS
Status: COMPLETED | OUTPATIENT
Start: 2024-01-20 | End: 2024-01-20

## 2024-01-20 RX ADMIN — CEFTRIAXONE 1 G: 1 INJECTION, POWDER, FOR SOLUTION INTRAMUSCULAR; INTRAVENOUS at 07:01

## 2024-01-20 RX ADMIN — KETOROLAC TROMETHAMINE 10 MG: 30 INJECTION, SOLUTION INTRAMUSCULAR; INTRAVENOUS at 07:01

## 2024-01-21 NOTE — ED TRIAGE NOTES
Sena Brumfield Fredy, a 33 y.o. female presents to the ED w/ complaint of left flank pain, malodors urine, fever and dysuria. Patient states her symptoms started a few days ago. Patient denies C/P,SOB, N/V/D.     Triage note:  Chief Complaint   Patient presents with    Flank Pain     L sided flank pain, malodorous urine, hematuria, fever, dysuria     Review of patient's allergies indicates:  No Known Allergies  No past medical history on file.

## 2024-01-21 NOTE — ED PROVIDER NOTES
Encounter Date: 2024       History     Chief Complaint   Patient presents with    Flank Pain     L sided flank pain, malodorous urine, hematuria, fever, dysuria     33-year-old female presents emergency department for left-sided flank pain, subjective fever, malodorous urine and dysuria x3 days.  Subjective fever earlier today which resolved Tylenol.  Denies any vaginal bleeding, nausea vomiting, chest pain or shortness breath.  States she is sharp pain from the left flank that radiates down her left lower quadrant.  States she thinks she had a kidney stone in the past 9 years ago but is not 100% sure.  CT renal stone did not show any evidence of obstructive uropathy.  Symptoms consistent with pyelonephritis she was given a dose of Rocephin in the ED will discharge with Keflex.  Discussed return ED precautions.        Review of patient's allergies indicates:  No Known Allergies  History reviewed. No pertinent past medical history.  Past Surgical History:   Procedure Laterality Date     SECTION      breech/tulane    GALLBLADDER SURGERY       History reviewed. No pertinent family history.  Social History     Tobacco Use    Smoking status: Former     Current packs/day: 0.00     Average packs/day: 0.1 packs/day for 4.0 years (0.4 ttl pk-yrs)     Types: Cigarettes     Start date: 2015     Quit date: 2019     Years since quittin.6    Smokeless tobacco: Never   Substance Use Topics    Alcohol use: Yes    Drug use: No     Review of Systems   Constitutional:  Positive for fever.   HENT:  Negative for sore throat.    Respiratory:  Negative for cough and shortness of breath.    Cardiovascular:  Negative for chest pain.   Gastrointestinal:  Positive for abdominal pain.   Genitourinary:  Positive for dysuria and flank pain.   Neurological:  Negative for weakness and headaches.   Psychiatric/Behavioral:  Negative for confusion.        Physical Exam     Initial Vitals [24 1738]   BP Pulse Resp Temp  SpO2   (!) 142/72 79 18 98.5 °F (36.9 °C) 100 %      MAP       --         Physical Exam    Nursing note and vitals reviewed.  Constitutional: She appears well-developed and well-nourished.   HENT:   Head: Normocephalic and atraumatic.   Neck: Neck supple.   Normal range of motion.  Cardiovascular:  Normal rate.           Pulmonary/Chest: Breath sounds normal.   Abdominal: Abdomen is soft. She exhibits no distension and no mass. There is no abdominal tenderness.   + Left CVA There is no rebound and no guarding.   Musculoskeletal:         General: Normal range of motion.      Cervical back: Normal range of motion and neck supple.     Neurological: She is alert and oriented to person, place, and time. GCS score is 15. GCS eye subscore is 4. GCS verbal subscore is 5. GCS motor subscore is 6.   Skin: Skin is warm and dry. Capillary refill takes less than 2 seconds.         ED Course   Procedures  Labs Reviewed   URINALYSIS, REFLEX TO URINE CULTURE - Abnormal; Notable for the following components:       Result Value    Leukocytes, UA 1+ (*)     All other components within normal limits    Narrative:     Specimen Source->Urine   URINALYSIS MICROSCOPIC - Abnormal; Notable for the following components:    WBC, UA 8 (*)     All other components within normal limits    Narrative:     Specimen Source->Urine   CBC W/ AUTO DIFFERENTIAL - Abnormal; Notable for the following components:    MPV 13.0 (*)     All other components within normal limits   COMPREHENSIVE METABOLIC PANEL   POCT URINE PREGNANCY          Imaging Results              CT Renal Stone Study ABD Pelvis WO (Final result)  Result time 01/20/24 20:52:53      Final result by Tato Miranda MD (01/20/24 20:52:53)                   Impression:      1. Large amount of stool throughout the colon suggests constipation.  Correlation is advised.  2. No findings to suggest obstructive uropathy noting the urinary bladder is distended.  Correlation with any history of  urinary retention or outlet obstruction.  3. Please see above for several additional findings.      Electronically signed by: Tato Miranda MD  Date:    01/20/2024  Time:    20:52               Narrative:    EXAMINATION:  CT RENAL STONE STUDY ABD PELVIS WO    CLINICAL HISTORY:  Nephrolithiasis, symptomatic/complicated;    TECHNIQUE:  Low dose axial images, sagittal and coronal reformations were obtained from the lung bases to the pubic symphysis.  Contrast was not administered.    COMPARISON:  Radiograph 02/25/2013, ultrasound 02/20/2013    FINDINGS:  Images of the lower thorax are remarkable for bilateral dependent atelectasis.    The liver, spleen, pancreas and adrenal glands have a grossly unremarkable noncontrast appearance.  The gallbladder is surgically absent, no significant biliary dilation.  The stomach is distended with ingested content without wall thickening.  There is liquid within the distal esophagus, suggesting sequela of gastroesophageal reflux.  Correlation recommended.  No significant abdominal lymphadenopathy.    The kidneys have a grossly unremarkable noncontrast appearance without hydronephrosis or nephrolithiasis.  The bilateral ureters are unable to be followed in their entirety to the urinary bladder, no definite calculi seen or secondary findings to suggest obstructive uropathy.  The urinary bladder is distended without wall thickening.  The uterus and adnexa are unremarkable.  No significant free fluid in the pelvis.    There is a large amount of stool in the colon.  The terminal ileum and appendix are unremarkable.  The small bowel is grossly unremarkable.  There are a few scattered shotty periaortic, pericaval, and mesenteric lymph nodes.  No focal organized pelvic fluid collection.    There are degenerative changes of the spine.  No significant inguinal lymphadenopathy.                                       Medications   cefTRIAXone (Rocephin) 1 g in dextrose 5 % in water (D5W) 100 mL  IVPB (MB+) (0 g Intravenous Stopped 1/20/24 2033)   ketorolac injection 9.999 mg (9.999 mg Intravenous Given 1/20/24 1954)     Medical Decision Making  33-year-old female presents emergency department for subjective fever, left flank pain and dysuria.      Differential includes but not limited to UTI, SAL, nephrolithiasis, pyelonephritis     Patient reports questionable history of kidney stones.  CT renal stone was obtained which did not show any evidence of obstructive uropathy.  UA slightly suspicious for UTI however given her symptomatology with fever and flank pain will treat for pyelonephritis.  Kidney function is normal.  Patient is overall well-appearing with benign abdominal exam and reassuring vitals.  Will give a dose of Rocephin in the ED and discharge with p.o. Keflex.  Discussed PCP follow-up as needed.  Return ED precautions given.    Amount and/or Complexity of Data Reviewed  Labs: ordered. Decision-making details documented in ED Course.  Radiology: ordered.    Risk  Prescription drug management.               ED Course as of 01/20/24 2126   Sat Jan 20, 2024 1938 Preg Test, Ur: Negative [HJ]   2029 WBC: 7.61  No leukocytosis [HJ]   2029 Creatinine: 0.7  No SAL [HJ]   2029 Comprehensive metabolic panel  No electrolyte derangement.  Liver function normal [HJ]      ED Course User Index  [HJ] Jono Oro PA-C                           Clinical Impression:  Final diagnoses:  [N12] Pyelonephritis (Primary)          ED Disposition Condition    Discharge Stable          ED Prescriptions       Medication Sig Dispense Start Date End Date Auth. Provider    cephALEXin (KEFLEX) 500 MG capsule Take 1 capsule (500 mg total) by mouth every 12 (twelve) hours. for 10 days 20 capsule 1/20/2024 1/30/2024 Jono Oro PA-C          Follow-up Information    None          Jono Oro PA-C  01/20/24 2126       Jono Oro PA-C  01/20/24 2126

## 2024-12-21 NOTE — ED PROVIDER NOTES
Encounter Date: 11/15/2020       History     Chief Complaint   Patient presents with    Contractions     Sena Daniel is a 30 y.o. [id number] at 38w1d presents complaining of contractions. Patient states that her contractions started 24 hours ago and are now 20 mins apart. She denies LOF and vaginal bleeding. She reports good fetal movement. This IUP is complicated by h/o c/s x1 (desires ).        Review of patient's allergies indicates:  No Known Allergies  No past medical history on file.  Past Surgical History:   Procedure Laterality Date     SECTION      breech/tulane    GALLBLADDER SURGERY       No family history on file.  Social History     Tobacco Use    Smoking status: Former Smoker     Packs/day: 0.10     Years: 4.00     Pack years: 0.40     Quit date: 2019     Years since quittin.4    Smokeless tobacco: Never Used   Substance Use Topics    Alcohol use: Yes    Drug use: No     Review of Systems   Constitutional: Negative for chills, fatigue and fever.   HENT: Negative for congestion.    Eyes: Negative for visual disturbance.   Respiratory: Negative for shortness of breath.    Cardiovascular: Negative for chest pain and palpitations.   Gastrointestinal: Positive for abdominal pain. Negative for diarrhea, nausea and vomiting.   Genitourinary: Negative for dysuria, pelvic pain, vaginal bleeding and vaginal discharge.   Musculoskeletal: Negative for back pain.   Skin: Negative for rash.   Neurological: Negative for headaches.   Psychiatric/Behavioral: Negative for agitation.       Physical Exam     Initial Vitals [11/15/20 1520]   BP Pulse Resp Temp SpO2   121/72 90 16 98 °F (36.7 °C) 99 %      MAP       --         Physical Exam    Vitals reviewed.  Constitutional: She appears well-developed and well-nourished. No distress.   HENT:   Head: Normocephalic and atraumatic.   Cardiovascular: Normal rate, regular rhythm and normal heart sounds.   Pulmonary/Chest: No respiratory  distress.   Abdominal: Soft. There is no abdominal tenderness. There is no rebound and no guarding.   Musculoskeletal: Normal range of motion. No tenderness or edema.   Neurological: She is alert and oriented to person, place, and time.   Skin: Skin is warm and dry.   Psychiatric: She has a normal mood and affect. Her behavior is normal. Judgment and thought content normal.     OB LABOR EXAM:       Method: Sterile vaginal exam per MD.   Vaginal Bleeding: none present.     Dilatation: 0.   Station: -3.   Effacement: 50%.   Amniotic Fluid Color: no fluid.           ED Course   Obtain Fetal nonstress test (NST)    Date/Time: 11/15/2020 3:40 PM  Performed by: Ally Kelly MD  Authorized by: Ally Kelly MD     Nonstress Test:     Variability:  6-25 BPM    Decelerations:  None    Accelerations:  15 bpm    Baseline:  140    Contractions:  Irregular    Contraction Frequency:  Q 5-7 mins  Biophysical Profile:     Nonstress Test Interpretation: reactive      Overall Impression:  Reassuring      Labs Reviewed - No data to display       Imaging Results    None          Medical Decision Making:   ED Management:  - VSS  - FHTs 140, reactive and reassuring  - [employer]: q 5-8 mins  - SVE: 0/50/-3  - Patient given labor precautions and encouraged to keep OB appointment at [facility] tomorrow                             Clinical Impression:     ICD-10-CM ICD-9-CM   1. Uterine contractions during pregnancy  O62.2 661.20   2. 38 weeks gestation of pregnancy  Z3A.38 V22.2                           ED Prescriptions     None        Follow-up Information    None                                      Ally Kelly MD  Resident  11/15/20 [id number]     stated

## 2025-01-26 ENCOUNTER — HOSPITAL ENCOUNTER (EMERGENCY)
Facility: HOSPITAL | Age: 35
Discharge: HOME | End: 2025-01-26
Attending: EMERGENCY MEDICINE

## 2025-01-26 VITALS
HEIGHT: 61 IN | DIASTOLIC BLOOD PRESSURE: 69 MMHG | HEART RATE: 100 BPM | BODY MASS INDEX: 23.6 KG/M2 | RESPIRATION RATE: 16 BRPM | WEIGHT: 125 LBS | TEMPERATURE: 99 F | OXYGEN SATURATION: 98 % | SYSTOLIC BLOOD PRESSURE: 140 MMHG

## 2025-01-26 DIAGNOSIS — N30.90 CYSTITIS: Primary | ICD-10-CM

## 2025-01-26 LAB
B-HCG UR QL: NEGATIVE
BILIRUB UR QL STRIP: NEGATIVE
CLARITY UR: CLEAR
COLOR UR: YELLOW
CTP QC/QA: YES
GLUCOSE UR QL STRIP: NEGATIVE
HGB UR QL STRIP: NEGATIVE
KETONES UR QL STRIP: NEGATIVE
LEUKOCYTE ESTERASE UR QL STRIP: NEGATIVE
NITRITE UR QL STRIP: NEGATIVE
PH UR STRIP: 6 [PH] (ref 5–8)
PROT UR QL STRIP: NEGATIVE
SP GR UR STRIP: <1.005 (ref 1–1.03)
URN SPEC COLLECT METH UR: ABNORMAL
UROBILINOGEN UR STRIP-ACNC: NEGATIVE EU/DL

## 2025-01-26 PROCEDURE — 99283 EMERGENCY DEPT VISIT LOW MDM: CPT

## 2025-01-26 PROCEDURE — 81025 URINE PREGNANCY TEST: CPT | Performed by: EMERGENCY MEDICINE

## 2025-01-26 PROCEDURE — 81003 URINALYSIS AUTO W/O SCOPE: CPT | Performed by: EMERGENCY MEDICINE

## 2025-01-26 RX ORDER — CEPHALEXIN 500 MG/1
500 CAPSULE ORAL 4 TIMES DAILY
Qty: 28 CAPSULE | Refills: 0 | Status: SHIPPED | OUTPATIENT
Start: 2025-01-26 | End: 2025-02-02

## 2025-01-26 NOTE — ED PROVIDER NOTES
Encounter Date: 2025       History     Chief Complaint   Patient presents with    Dysuria     C/o painful dark foul smelling urine and LLQ ABD pain x2 days. Taking AZO w/o relief.      The patient is a 34-year-old female who has had burning with urination for the past 2 days.  No fever, no nausea or vomiting.  She has been taking azo without relief.  She states her urine is cloudy.  No back pain, some suprapubic abdominal pain.      Review of patient's allergies indicates:  No Known Allergies  No past medical history on file.  Past Surgical History:   Procedure Laterality Date     SECTION      breech/tulane    GALLBLADDER SURGERY       No family history on file.  Social History     Tobacco Use    Smoking status: Former     Current packs/day: 0.00     Average packs/day: 0.1 packs/day for 4.0 years (0.4 ttl pk-yrs)     Types: Cigarettes     Start date: 2015     Quit date: 2019     Years since quittin.6    Smokeless tobacco: Never   Substance Use Topics    Alcohol use: Yes    Drug use: No     Review of Systems   All other systems reviewed and are negative.      Physical Exam     Initial Vitals [25 1730]   BP Pulse Resp Temp SpO2   (!) 140/69 100 16 98.5 °F (36.9 °C) 98 %      MAP       --         Physical Exam    Nursing note and vitals reviewed.  Constitutional: She appears well-developed and well-nourished.   Abdominal: Abdomen is soft. She exhibits no distension. There is no abdominal tenderness.   Musculoskeletal:         General: Normal range of motion.      Comments: No CVA tenderness           ED Course   Procedures  Labs Reviewed   URINALYSIS, REFLEX TO URINE CULTURE - Abnormal       Result Value    Specimen UA Urine, Clean Catch      Color, UA Yellow      Appearance, UA Clear      pH, UA 6.0      Specific Gravity, UA <1.005 (*)     Protein, UA Negative      Glucose, UA Negative      Ketones, UA Negative      Bilirubin (UA) Negative      Occult Blood UA Negative      Nitrite, UA  Negative      Urobilinogen, UA Negative      Leukocytes, UA Negative      Narrative:     Specimen Source->Urine   POCT URINE PREGNANCY    POC Preg Test, Ur Negative       Acceptable Yes            Imaging Results    None          Medications - No data to display  Medical Decision Making  Differential Diagnosis includes, but is not limited to:  UTI/pyelonephritis, kidney stone, urinary retention, urethritis, appendicitis, prostatitis, testicular torsion/mass, incarcerated/strangulated hernia.     MDM: The patient is a 34-year-old female with dysuria for 2 days.  No fever, no CVA tenderness.    Amount and/or Complexity of Data Reviewed  Labs: ordered.    Risk  Prescription drug management.                                      Clinical Impression:  Final diagnoses:  [N30.90] Cystitis (Primary)                 Pretty Fountain MD  01/26/25 [id number]

## 2025-01-30 ENCOUNTER — HOSPITAL ENCOUNTER (EMERGENCY)
Facility: HOSPITAL | Age: 35
Discharge: HOME | End: 2025-01-31
Attending: EMERGENCY MEDICINE

## 2025-01-30 VITALS
HEART RATE: 80 BPM | DIASTOLIC BLOOD PRESSURE: 81 MMHG | WEIGHT: 125 LBS | HEIGHT: 61 IN | SYSTOLIC BLOOD PRESSURE: 121 MMHG | BODY MASS INDEX: 23.6 KG/M2 | OXYGEN SATURATION: 98 % | TEMPERATURE: 98 F | RESPIRATION RATE: 16 BRPM

## 2025-01-30 DIAGNOSIS — R10.32 LLQ ABDOMINAL PAIN: ICD-10-CM

## 2025-01-30 DIAGNOSIS — R10.9 FLANK PAIN: Primary | ICD-10-CM

## 2025-01-30 DIAGNOSIS — R11.2 NAUSEA AND VOMITING, UNSPECIFIED VOMITING TYPE: ICD-10-CM

## 2025-01-30 LAB
ALBUMIN SERPL BCP-MCNC: 4.7 G/DL (ref 3.5–5.2)
ALP SERPL-CCNC: 73 U/L (ref 40–150)
ALT SERPL W/O P-5'-P-CCNC: 39 U/L (ref 10–44)
ANION GAP SERPL CALC-SCNC: 10 MMOL/L (ref 8–16)
AST SERPL-CCNC: 24 U/L (ref 10–40)
B-HCG UR QL: NEGATIVE
BASOPHILS # BLD AUTO: 0.06 K/UL (ref 0–0.2)
BASOPHILS NFR BLD: 0.8 % (ref 0–1.9)
BILIRUB SERPL-MCNC: 0.8 MG/DL (ref 0.1–1)
BILIRUB UR QL STRIP: NEGATIVE
BUN SERPL-MCNC: 5 MG/DL (ref 6–20)
CALCIUM SERPL-MCNC: 9.6 MG/DL (ref 8.7–10.5)
CHLORIDE SERPL-SCNC: 108 MMOL/L (ref 95–110)
CLARITY UR REFRACT.AUTO: CLEAR
CO2 SERPL-SCNC: 21 MMOL/L (ref 23–29)
COLOR UR AUTO: YELLOW
CREAT SERPL-MCNC: 0.7 MG/DL (ref 0.5–1.4)
CTP QC/QA: YES
DIFFERENTIAL METHOD BLD: ABNORMAL
EOSINOPHIL # BLD AUTO: 0 K/UL (ref 0–0.5)
EOSINOPHIL NFR BLD: 0.3 % (ref 0–8)
ERYTHROCYTE [DISTWIDTH] IN BLOOD BY AUTOMATED COUNT: 12.2 % (ref 11.5–14.5)
EST. GFR  (NO RACE VARIABLE): >60 ML/MIN/1.73 M^2
GLUCOSE SERPL-MCNC: 96 MG/DL (ref 70–110)
GLUCOSE UR QL STRIP: NEGATIVE
HCT VFR BLD AUTO: 40.6 % (ref 37–48.5)
HCV AB SERPL QL IA: NORMAL
HGB BLD-MCNC: 13.9 G/DL (ref 12–16)
HGB UR QL STRIP: NEGATIVE
HIV 1+2 AB+HIV1 P24 AG SERPL QL IA: NORMAL
IMM GRANULOCYTES # BLD AUTO: 0.03 K/UL (ref 0–0.04)
IMM GRANULOCYTES NFR BLD AUTO: 0.4 % (ref 0–0.5)
KETONES UR QL STRIP: ABNORMAL
LEUKOCYTE ESTERASE UR QL STRIP: NEGATIVE
LIPASE SERPL-CCNC: 16 U/L (ref 4–60)
LYMPHOCYTES # BLD AUTO: 2.2 K/UL (ref 1–4.8)
LYMPHOCYTES NFR BLD: 30 % (ref 18–48)
MCH RBC QN AUTO: 31.2 PG (ref 27–31)
MCHC RBC AUTO-ENTMCNC: 34.2 G/DL (ref 32–36)
MCV RBC AUTO: 91 FL (ref 82–98)
MONOCYTES # BLD AUTO: 0.6 K/UL (ref 0.3–1)
MONOCYTES NFR BLD: 7.6 % (ref 4–15)
NEUTROPHILS # BLD AUTO: 4.5 K/UL (ref 1.8–7.7)
NEUTROPHILS NFR BLD: 60.9 % (ref 38–73)
NITRITE UR QL STRIP: NEGATIVE
NRBC BLD-RTO: 0 /100 WBC
PH UR STRIP: 6 [PH] (ref 5–8)
PLATELET # BLD AUTO: 210 K/UL (ref 150–450)
PMV BLD AUTO: 12.9 FL (ref 9.2–12.9)
POTASSIUM SERPL-SCNC: 3.7 MMOL/L (ref 3.5–5.1)
PROT SERPL-MCNC: 8.3 G/DL (ref 6–8.4)
PROT UR QL STRIP: NEGATIVE
RBC # BLD AUTO: 4.45 M/UL (ref 4–5.4)
SODIUM SERPL-SCNC: 139 MMOL/L (ref 136–145)
SP GR UR STRIP: 1.01 (ref 1–1.03)
URN SPEC COLLECT METH UR: ABNORMAL
WBC # BLD AUTO: 7.34 K/UL (ref 3.9–12.7)

## 2025-01-30 PROCEDURE — 63600175 PHARM REV CODE 636 W HCPCS: Performed by: PHYSICIAN ASSISTANT

## 2025-01-30 PROCEDURE — 99285 EMERGENCY DEPT VISIT HI MDM: CPT | Mod: 25

## 2025-01-30 PROCEDURE — 96361 HYDRATE IV INFUSION ADD-ON: CPT

## 2025-01-30 PROCEDURE — 86803 HEPATITIS C AB TEST: CPT | Performed by: PHYSICIAN ASSISTANT

## 2025-01-30 PROCEDURE — 81003 URINALYSIS AUTO W/O SCOPE: CPT | Performed by: EMERGENCY MEDICINE

## 2025-01-30 PROCEDURE — 81515 NFCT DS BV&VAGINITIS DNA ALG: CPT | Performed by: PHYSICIAN ASSISTANT

## 2025-01-30 PROCEDURE — 25500020 PHARM REV CODE 255: Performed by: EMERGENCY MEDICINE

## 2025-01-30 PROCEDURE — 87389 HIV-1 AG W/HIV-1&-2 AB AG IA: CPT | Performed by: PHYSICIAN ASSISTANT

## 2025-01-30 PROCEDURE — 96375 TX/PRO/DX INJ NEW DRUG ADDON: CPT

## 2025-01-30 PROCEDURE — 80053 COMPREHEN METABOLIC PANEL: CPT | Performed by: EMERGENCY MEDICINE

## 2025-01-30 PROCEDURE — 83690 ASSAY OF LIPASE: CPT | Performed by: EMERGENCY MEDICINE

## 2025-01-30 PROCEDURE — 87591 N.GONORRHOEAE DNA AMP PROB: CPT | Performed by: PHYSICIAN ASSISTANT

## 2025-01-30 PROCEDURE — 85025 COMPLETE CBC W/AUTO DIFF WBC: CPT | Performed by: EMERGENCY MEDICINE

## 2025-01-30 PROCEDURE — 81025 URINE PREGNANCY TEST: CPT | Performed by: EMERGENCY MEDICINE

## 2025-01-30 PROCEDURE — 96374 THER/PROPH/DIAG INJ IV PUSH: CPT

## 2025-01-30 PROCEDURE — 25000003 PHARM REV CODE 250: Performed by: PHYSICIAN ASSISTANT

## 2025-01-30 RX ORDER — OXYCODONE HYDROCHLORIDE 5 MG/1
5 TABLET ORAL
Status: COMPLETED | OUTPATIENT
Start: 2025-01-30 | End: 2025-01-30

## 2025-01-30 RX ORDER — KETOROLAC TROMETHAMINE 30 MG/ML
15 INJECTION, SOLUTION INTRAMUSCULAR; INTRAVENOUS
Status: COMPLETED | OUTPATIENT
Start: 2025-01-30 | End: 2025-01-30

## 2025-01-30 RX ORDER — ONDANSETRON HYDROCHLORIDE 2 MG/ML
4 INJECTION, SOLUTION INTRAVENOUS
Status: COMPLETED | OUTPATIENT
Start: 2025-01-30 | End: 2025-01-30

## 2025-01-30 RX ADMIN — IOHEXOL 75 ML: 350 INJECTION, SOLUTION INTRAVENOUS at 07:01

## 2025-01-30 RX ADMIN — KETOROLAC TROMETHAMINE 15 MG: 30 INJECTION, SOLUTION INTRAMUSCULAR; INTRAVENOUS at 07:01

## 2025-01-30 RX ADMIN — SODIUM CHLORIDE 1000 ML: 9 INJECTION, SOLUTION INTRAVENOUS at 07:01

## 2025-01-30 RX ADMIN — OXYCODONE 5 MG: 5 TABLET ORAL at 09:01

## 2025-01-30 RX ADMIN — ONDANSETRON 4 MG: 2 INJECTION INTRAMUSCULAR; INTRAVENOUS at 07:01

## 2025-01-30 NOTE — FIRST PROVIDER EVALUATION
"Medical screening examination initiated.  I have conducted a focused provider triage encounter, findings are as follows:    Brief history of present illness:  Pain in left flank radiating to left lower quadrant.  Recently diagnosed with cystitis on Keflex x 4 days.  Fever per her  earlier today.    Vitals:    01/30/25 1521   BP: (!) 164/91   Pulse: 99   Resp: 18   Temp: 98.7 °F (37.1 °C)   TempSrc: Oral   SpO2: 100%   Weight: 56.7 kg (125 lb)   Height: 5' 1" (1.549 m)       Pertinent physical exam:  Resting in NAD.    Brief workup plan:  Serum and urine labs    Preliminary workup initiated; this workup will be continued and followed by the physician or advanced practice provider that is assigned to the patient when roomed.  "

## 2025-01-31 LAB
BACTERIAL VAGINOSIS DNA: NOT DETECTED
C TRACH DNA SPEC QL NAA+PROBE: NOT DETECTED
CANDIDA GLABRATA/KRUSEI: NOT DETECTED
CANDIDA RRNA VAG QL PROBE: NOT DETECTED
N GONORRHOEA DNA SPEC QL NAA+PROBE: NOT DETECTED
TRICHOMONAS VAGINALIS: NOT DETECTED

## 2025-01-31 RX ORDER — NAPROXEN 500 MG/1
500 TABLET ORAL 2 TIMES DAILY WITH MEALS
Qty: 20 TABLET | Refills: 0 | Status: SHIPPED | OUTPATIENT
Start: 2025-01-31 | End: 2025-02-10

## 2025-01-31 RX ORDER — DICYCLOMINE HYDROCHLORIDE 20 MG/1
20 TABLET ORAL 2 TIMES DAILY
Qty: 60 TABLET | Refills: 0 | Status: SHIPPED | OUTPATIENT
Start: 2025-01-31 | End: 2025-03-02

## 2025-01-31 RX ORDER — ONDANSETRON 4 MG/1
4 TABLET, FILM COATED ORAL EVERY 6 HOURS
Qty: 28 TABLET | Refills: 0 | Status: SHIPPED | OUTPATIENT
Start: 2025-01-31 | End: 2025-02-07

## 2025-01-31 NOTE — ED PROVIDER NOTES
Encounter Date: 2025       History     Chief Complaint   Patient presents with    Urinary Tract Infection     On keflex 4 d, now today had fever , having more pain     34-year-old female with a PMHx of cholecystectomy presents to ED with left flank pain x4 days.  Pain radiates around her left flank into her left lower quadrant and suprapubic area. She has associated nausea, vomiting, dysuria, hematuria.  She was evaluated 4 days ago at [facility] and was diagnosed with a UTI.  She was started on Keflex.  She denies improvement of her symptoms on Keflex.  She is not tolerating p.o..  She has also had 4 episodes of nonbloody diarrhea today.  She reports subjective fever today.  No prior history of kidney stones.  She started spotting/menstrual cycle yesterday. Patient is on birth control.  She denies constipation, bloody stool, melena, vaginal discharge, vaginal bleeding.    The history is provided by the patient.     Review of patient's allergies indicates:  No Known Allergies  History reviewed. No pertinent past medical history.  Past Surgical History:   Procedure Laterality Date     SECTION      breech/tulane    GALLBLADDER SURGERY       No family history on file.  Social History     Tobacco Use    Smoking status: Former     Current packs/day: 0.00     Average packs/day: 0.1 packs/day for 4.0 years (0.4 ttl pk-yrs)     Types: Cigarettes     Start date: 2015     Quit date: 2019     Years since quittin.6    Smokeless tobacco: Never   Substance Use Topics    Alcohol use: Yes    Drug use: No     Review of Systems   Constitutional:  Positive for appetite change and fever.   Gastrointestinal:  Positive for abdominal pain, nausea and vomiting. Negative for blood in stool, constipation and diarrhea.   Genitourinary:  Positive for dysuria, flank pain and hematuria. Negative for vaginal bleeding and vaginal discharge.       Physical Exam     Initial Vitals [25 1521]   BP Pulse Resp Temp  SpO2   (!) 164/91 99 18 98.7 °F (37.1 °C) 100 %      MAP       --         Physical Exam    Nursing note and vitals reviewed.  Constitutional: She appears well-developed and well-nourished. She is not diaphoretic. No distress.   HENT:   Head: Normocephalic and atraumatic.   Nose: Nose normal.   Eyes: Conjunctivae and EOM are normal.   Neck: Neck supple.   Cardiovascular:  Normal rate.           Pulmonary/Chest: No respiratory distress.   Abdominal: Abdomen is soft. There is abdominal tenderness in the suprapubic area and left lower quadrant.   There is left CVA tenderness. There is no guarding.   Genitourinary: Cervix exhibits no motion tenderness and no discharge. Right adnexum displays no tenderness. Left adnexum displays no tenderness.    Vaginal bleeding present.      No vaginal discharge or tenderness.   There is bleeding in the vagina. No tenderness in the vagina.    Genitourinary Comments: Small amount vaginal bleeding. Chaperone present     Musculoskeletal:      Cervical back: Neck supple.     Neurological: She is alert and oriented to person, place, and time. Gait normal.   Skin: No rash noted.   Psychiatric: She has a normal mood and affect. Thought content normal.         ED Course   Procedures  Labs Reviewed   CBC W/ AUTO DIFFERENTIAL - Abnormal       Result Value    WBC 7.34      RBC 4.45      Hemoglobin 13.9      Hematocrit 40.6      MCV 91      MCH 31.2 (*)     MCHC 34.2      RDW 12.2      Platelets 210      MPV 12.9      Immature Granulocytes 0.4      Gran # (ANC) 4.5      Immature Grans (Abs) 0.03      Lymph # 2.2      Mono # 0.6      Eos # 0.0      Baso # 0.06      nRBC 0      Gran % 60.9      Lymph % 30.0      Mono % 7.6      Eosinophil % 0.3      Basophil % 0.8      Differential Method Automated      Narrative:     Release to patient->Immediate   COMPREHENSIVE METABOLIC PANEL - Abnormal    Sodium 139      Potassium 3.7      Chloride 108      CO2 21 (*)     Glucose 96      BUN 5 (*)     Creatinine  0.7      Calcium 9.6      Total Protein 8.3      Albumin 4.7      Total Bilirubin 0.8      Alkaline Phosphatase 73      AST 24      ALT 39      eGFR >60.0      Anion Gap 10      Narrative:     Release to patient->Immediate   URINALYSIS, REFLEX TO URINE CULTURE - Abnormal    Specimen UA Urine, Clean Catch      Color, UA Yellow      Appearance, UA Clear      pH, UA 6.0      Specific Gravity, UA 1.010      Protein, UA Negative      Glucose, UA Negative      Ketones, UA 2+ (*)     Bilirubin (UA) Negative      Occult Blood UA Negative      Nitrite, UA Negative      Leukocytes, UA Negative      Narrative:     Specimen Source->Urine   LIPASE    Lipase 16      Narrative:     Release to patient->Immediate   HEPATITIS C ANTIBODY    Hepatitis C Ab Non-reactive      Narrative:     Release to patient->Immediate   HIV 1 / 2 ANTIBODY    HIV 1/2 Ag/Ab Non-reactive      Narrative:     Release to patient->Immediate   C. TRACHOMATIS/N. GONORRHOEAE BY AMP DNA    Chlamydia, Amplified DNA Not Detected      N gonorrhoeae, amplified DNA Not Detected      Narrative:     Sources by Resulting Lab:->[facility]  Release to patient->Immediate   VAGINOSIS SCREEN BY DNA PROBE    Bacterial vaginosis DNA Not Detected      Candida sp Not Detected      Candida glabrata/krusei Not Detected      Trichomonas vaginalis Not Detected      Narrative:     Release to patient->Immediate   POCT URINE PREGNANCY    POC Preg Test, Ur Negative       Acceptable Yes            Imaging Results              US Pelvis Comp with Transvag NON-OB (xpd) (Final result)  Result time 01/31/25 00:21:23   Procedure changed from US Transvaginal Non OB     Final result by Anant Wang MD (01/31/25 00:21:23)                   Impression:      There is no sonographic evidence for acute process.      Electronically signed by: Anant Wang  Date:    01/31/2025  Time:    00:21               Narrative:    EXAMINATION:  US PELVIS COMP WITH TRANSVAG NON-OB  (XPD)    CLINICAL HISTORY:  LLQ abdominal pain; Left lower quadrant pain    TECHNIQUE:  Transabdominal sonography of the pelvis was performed, followed by transvaginal sonography to better evaluate the uterus and ovaries.    COMPARISON:  CT examination of the abdomen and pelvis January 30, 2025    FINDINGS:  The technologist indicates transvaginal imaging is somewhat limited.  Transabdominal imaging better than transvaginal imaging on this exam.  The uterus measures approximately 11.3 cm in length.  Submitted endometrial thickness measurement is approximately 4.2 mm.    The right ovary measures approximately 4.5 x 1.6 x 3.2 cm in size.  Doppler imaging demonstrates vascular flow to the right ovary.    The left ovary measures approximately 4 x 1.9 x 4 cm in size.  Doppler imaging demonstrates vascular flow to the left ovary.    There is no sonographic evidence for abnormal free fluid of the pelvis and no additional sonographic evidence for pelvic mass or cystic collection.                                       CT Abdomen Pelvis With IV Contrast NO Oral Contrast (Final result)  Result time 01/30/25 20:31:54      Final result by Tato Miranda MD (01/30/25 20:31:54)                   Impression:      1. Findings may reflect hepatic steatosis, correlation with LFTs recommended.  2. The urinary bladder is distended, correlation with any history of outlet obstruction or urinary retention.  Correlation with urinalysis to exclude cystitis as warranted.  3. Moderate stool in the right colon.  4. Please see above for several additional findings.      Electronically signed by: Tato Miranda MD  Date:    01/30/2025  Time:    20:31               Narrative:    EXAMINATION:  CT ABDOMEN PELVIS WITH IV CONTRAST    CLINICAL HISTORY:  Nausea/vomiting;UTI, recurrent/complicated (Female);flank pain and lower abdominal pain;    TECHNIQUE:  Low dose axial images, sagittal and coronal reformations were obtained from the lung bases  to the pubic symphysis following the IV administration of 75 mL of Omnipaque 350 .  Oral contrast was not given.    COMPARISON:  01/20/2024    FINDINGS:  Images of the lower thorax are unremarkable.    The liver is hypoattenuating, suggesting steatosis, correlation with LFTs recommended.  There is a vague enhancing lesion within the right hepatic dome measuring 1.1 cm, nonspecific, possibly flash filling hemangioma.  There are several similar appearing lesions throughout the right hepatic lobe, subcentimeter, same differential.  The spleen, pancreas and adrenal glands are unremarkable.  The gallbladder is surgically absent, no significant biliary dilation status post cholecystectomy.  The stomach is decompressed without wall thickening.  The portal vein, splenic vein, SMV, celiac axis and SMA all are patent.  No significant abdominal lymphadenopathy.    The kidneys enhance symmetrically without hydronephrosis or nephrolithiasis.  The bilateral ureters are unable to be followed in their entirety to the urinary bladder, no definite calculi seen or secondary findings to suggest obstructive uropathy.  The urinary bladder is distended without wall thickening.  The uterus and adnexa are unremarkable.    The large bowel is grossly unremarkable noting moderate stool in the colon.  The terminal ileum is unremarkable.  The appendix is unremarkable.  The small bowel is grossly unremarkable.  There are a few scattered shotty periaortic, pericaval, and mesenteric lymph nodes.  No focal organized pelvic fluid collection.    No acute osseous abnormalities.  No significant inguinal lymphadenopathy.                                       Medications   ondansetron injection 4 mg (4 mg Intravenous Given 1/30/25 1922)   ketorolac injection 15 mg (15 mg Intravenous Given 1/30/25 1924)   sodium chloride 0.9% bolus 1,000 mL 1,000 mL (0 mLs Intravenous Stopped 1/30/25 2027)   iohexoL (OMNIPAQUE 350) injection 75 mL (75 mLs Intravenous Given  1/30/25 1959)   oxyCODONE immediate release tablet 5 mg (5 mg Oral Given 1/30/25 2123)     Medical Decision Making  34-year-old female with a PMHx of cholecystectomy presents to ED with left flank pain x4 days. Nontoxic appearing.  Hypertensive.  Afebrile. Exam as above. I will initiate workup and reassess.    Ddx:  Obstructive uropathy, hydronephrosis, pyelonephritis, UTI, colitis, PID, ovarian torsion, ectopic pregnancy    - labs without leukocytosis.  No anemia  - mildly low bicarb otherwise unremarkable chemistry  - UA negative for nitrites, leukocytes, blood concerning for infection  - pregnancy test negative.    - CT AP with distended bladder.  Postvoid residual obtained with 25 mL in the bladder.  Otherwise unremarkable for causes of left flank pain and left lower quadrant abdominal pain.   - patient continues to have abdominal pain.  Pelvic exam performed with a small amount of vaginal bleeding which started yesterday per patient.  No vaginal discharge, CMT, adnexal tenderness or masses.  She denies concern for STIs at this time.  Vaginitis and STI screening obtained  - pending transvaginal ultrasound at this time to rule out pelvic etiology of pain  - I will sign out at this time to Jono Oro PA-C due to shift change.  Pending improvement of pain and ultrasound study at this time     Amount and/or Complexity of Data Reviewed  Labs: ordered.  Radiology: ordered.    Risk  Prescription drug management.                                      Clinical Impression:  Final diagnoses:  [R10.9] Flank pain (Primary)  [R11.2] Nausea and vomiting, unspecified vomiting type  [R10.32] LLQ abdominal pain          ED Disposition Condition    Discharge Stable          ED Prescriptions       Medication Sig Dispense Start Date End Date Auth. Provider    dicyclomine (BENTYL) 20 mg tablet Take 1 tablet (20 mg total) by mouth 2 (two) times daily. 60 tablet 1/31/2025 3/2/2025 Jono Oro PA-C    naproxen (NAPROSYN) 500 MG tablet  Take 1 tablet (500 mg total) by mouth 2 (two) times daily with meals. for 10 days 20 tablet 1/31/2025 2/10/2025 Jono Oro PA-C    ondansetron (ZOFRAN) 4 MG tablet Take 1 tablet (4 mg total) by mouth every 6 (six) hours. for 7 days 28 tablet 1/31/2025 2/7/2025 Jono Oro PA-C          Follow-up Information    None          Kisha Rodriguez PA-C  01/31/25 1946

## 2025-01-31 NOTE — ED PROVIDER NOTES
ED Physician Hand-off Note:    ED Course: I assumed care of patient from off-going ED physician team. Briefly, Patient is a 34-year-old female who presents ED with left flank/abdominal pain.    At the time of signout plan was pending pelvic ultrasound.    Medications given in the ED:    Medications   ondansetron injection 4 mg (4 mg Intravenous Given 1/30/25 1922)   ketorolac injection 15 mg (15 mg Intravenous Given 1/30/25 1924)   sodium chloride 0.9% bolus 1,000 mL 1,000 mL (0 mLs Intravenous Stopped 1/30/25 2027)   iohexoL (OMNIPAQUE 350) injection 75 mL (75 mLs Intravenous Given 1/30/25 1959)   oxyCODONE immediate release tablet 5 mg (5 mg Oral Given 1/30/25 2123)       Disposition:  At shift change pending ultrasound.  Reviewed CT abdomen pelvis as well as pelvic ultrasound with no acute or emergent process seen.  Labs and UA are unremarkable.  Her vital signs are reassuring.  Previous provider performed pelvic exam with no CMT or adnexal tenderness and GC is pending.  Less likely PID in the ED benign exam.  Will discharge with NSAIDs, Bentyl Zofran and recommend follow-up with her PCP.  Return ED precautions given    Patient comfortable with discharge. Patient counseled regarding exam, results, diagnosis, treatment, and plan.    Impression:  Left lower quadrant abdominal pain, flank pain, nausea vomiting       Jono Oro PA-C  01/31/25 0032

## 2025-05-06 ENCOUNTER — HOSPITAL ENCOUNTER (EMERGENCY)
Facility: HOSPITAL | Age: 35
Discharge: HOME | End: 2025-05-06
Attending: EMERGENCY MEDICINE

## 2025-05-06 VITALS
TEMPERATURE: 98 F | WEIGHT: 128 LBS | OXYGEN SATURATION: 99 % | HEIGHT: 61 IN | BODY MASS INDEX: 24.17 KG/M2 | HEART RATE: 79 BPM | RESPIRATION RATE: 18 BRPM | DIASTOLIC BLOOD PRESSURE: 75 MMHG | SYSTOLIC BLOOD PRESSURE: 123 MMHG

## 2025-05-06 DIAGNOSIS — R42 DIZZINESS: ICD-10-CM

## 2025-05-06 DIAGNOSIS — R07.9 CHEST PAIN: ICD-10-CM

## 2025-05-06 LAB
ABSOLUTE EOSINOPHIL (OHS): 0.04 K/UL
ABSOLUTE MONOCYTE (OHS): 0.5 K/UL (ref 0.3–1)
ABSOLUTE NEUTROPHIL COUNT (OHS): 5.08 K/UL (ref 1.8–7.7)
ALBUMIN SERPL BCP-MCNC: 4.4 G/DL (ref 3.5–5.2)
ALP SERPL-CCNC: 74 UNIT/L (ref 40–150)
ALT SERPL W/O P-5'-P-CCNC: 23 UNIT/L (ref 10–44)
ANION GAP (OHS): 11 MMOL/L (ref 8–16)
AST SERPL-CCNC: 17 UNIT/L (ref 11–45)
BASOPHILS # BLD AUTO: 0.07 K/UL
BASOPHILS NFR BLD AUTO: 0.9 %
BILIRUB SERPL-MCNC: 0.5 MG/DL (ref 0.1–1)
BNP SERPL-MCNC: <10 PG/ML (ref 0–99)
BUN SERPL-MCNC: 11 MG/DL (ref 6–30)
BUN SERPL-MCNC: 12 MG/DL (ref 6–20)
CALCIUM SERPL-MCNC: 9.3 MG/DL (ref 8.7–10.5)
CHLORIDE SERPL-SCNC: 105 MMOL/L (ref 95–110)
CHLORIDE SERPL-SCNC: 106 MMOL/L (ref 95–110)
CO2 SERPL-SCNC: 23 MMOL/L (ref 23–29)
CREAT SERPL-MCNC: 0.7 MG/DL (ref 0.5–1.4)
CREAT SERPL-MCNC: 0.8 MG/DL (ref 0.5–1.4)
D DIMER PPP IA.FEU-MCNC: 0.22 MG/L FEU
ERYTHROCYTE [DISTWIDTH] IN BLOOD BY AUTOMATED COUNT: 11.8 % (ref 11.5–14.5)
GFR SERPLBLD CREATININE-BSD FMLA CKD-EPI: >60 ML/MIN/1.73/M2
GLUCOSE SERPL-MCNC: 94 MG/DL (ref 70–110)
GLUCOSE SERPL-MCNC: 98 MG/DL (ref 70–110)
HCT VFR BLD AUTO: 40.7 % (ref 37–48.5)
HCT VFR BLD CALC: 40 %PCV (ref 36–54)
HGB BLD-MCNC: 13.7 GM/DL (ref 12–16)
IMM GRANULOCYTES # BLD AUTO: 0.02 K/UL (ref 0–0.04)
IMM GRANULOCYTES NFR BLD AUTO: 0.3 % (ref 0–0.5)
INFLUENZA A MOLECULAR (OHS): NEGATIVE
INFLUENZA B MOLECULAR (OHS): NEGATIVE
LYMPHOCYTES # BLD AUTO: 2.02 K/UL (ref 1–4.8)
MCH RBC QN AUTO: 30.6 PG (ref 27–31)
MCHC RBC AUTO-ENTMCNC: 33.7 G/DL (ref 32–36)
MCV RBC AUTO: 91 FL (ref 82–98)
NUCLEATED RBC (/100WBC) (OHS): 0 /100 WBC
OHS QRS DURATION: 80 MS
OHS QRS DURATION: 82 MS
OHS QTC CALCULATION: 455 MS
OHS QTC CALCULATION: 477 MS
PLATELET # BLD AUTO: 218 K/UL (ref 150–450)
PMV BLD AUTO: 13.4 FL (ref 9.2–12.9)
POC IONIZED CALCIUM: 1.17 MMOL/L (ref 1.06–1.42)
POC TCO2 (MEASURED): 24 MMOL/L (ref 23–29)
POTASSIUM BLD-SCNC: 3.7 MMOL/L (ref 3.5–5.1)
POTASSIUM SERPL-SCNC: 3.5 MMOL/L (ref 3.5–5.1)
PROT SERPL-MCNC: 7.5 GM/DL (ref 6–8.4)
RBC # BLD AUTO: 4.47 M/UL (ref 4–5.4)
RELATIVE EOSINOPHIL (OHS): 0.5 %
RELATIVE LYMPHOCYTE (OHS): 26.1 % (ref 18–48)
RELATIVE MONOCYTE (OHS): 6.5 % (ref 4–15)
RELATIVE NEUTROPHIL (OHS): 65.7 % (ref 38–73)
SAMPLE: NORMAL
SARS-COV-2 RDRP RESP QL NAA+PROBE: NEGATIVE
SODIUM BLD-SCNC: 140 MMOL/L (ref 136–145)
SODIUM SERPL-SCNC: 140 MMOL/L (ref 136–145)
TROPONIN I SERPL HS-MCNC: 3 NG/L
TROPONIN I SERPL HS-MCNC: 4 NG/L
TROPONIN I SERPL HS-MCNC: 4 NG/L
WBC # BLD AUTO: 7.73 K/UL (ref 3.9–12.7)

## 2025-05-06 PROCEDURE — 85379 FIBRIN DEGRADATION QUANT: CPT

## 2025-05-06 PROCEDURE — 83880 ASSAY OF NATRIURETIC PEPTIDE: CPT

## 2025-05-06 PROCEDURE — 85025 COMPLETE CBC W/AUTO DIFF WBC: CPT

## 2025-05-06 PROCEDURE — U0002 COVID-19 LAB TEST NON-CDC: HCPCS

## 2025-05-06 PROCEDURE — 87502 INFLUENZA DNA AMP PROBE: CPT

## 2025-05-06 PROCEDURE — 80047 BASIC METABLC PNL IONIZED CA: CPT

## 2025-05-06 PROCEDURE — 93005 ELECTROCARDIOGRAM TRACING: CPT

## 2025-05-06 PROCEDURE — 93010 ELECTROCARDIOGRAM REPORT: CPT | Mod: 76,,, | Performed by: INTERNAL MEDICINE

## 2025-05-06 PROCEDURE — 93010 ELECTROCARDIOGRAM REPORT: CPT | Mod: ,,, | Performed by: INTERNAL MEDICINE

## 2025-05-06 PROCEDURE — 84484 ASSAY OF TROPONIN QUANT: CPT

## 2025-05-06 PROCEDURE — 25000003 PHARM REV CODE 250

## 2025-05-06 PROCEDURE — 99285 EMERGENCY DEPT VISIT HI MDM: CPT | Mod: 25

## 2025-05-06 PROCEDURE — 94761 N-INVAS EAR/PLS OXIMETRY MLT: CPT

## 2025-05-06 PROCEDURE — 82040 ASSAY OF SERUM ALBUMIN: CPT

## 2025-05-06 RX ORDER — NITROGLYCERIN 0.4 MG/1
0.4 TABLET SUBLINGUAL
Status: DISCONTINUED | OUTPATIENT
Start: 2025-05-06 | End: 2025-05-06

## 2025-05-06 RX ORDER — ASPIRIN 325 MG
325 TABLET ORAL
Status: COMPLETED | OUTPATIENT
Start: 2025-05-06 | End: 2025-05-06

## 2025-05-06 RX ORDER — IBUPROFEN 600 MG/1
600 TABLET, FILM COATED ORAL
Status: COMPLETED | OUTPATIENT
Start: 2025-05-06 | End: 2025-05-06

## 2025-05-06 RX ORDER — ALUMINUM HYDROXIDE, MAGNESIUM HYDROXIDE, AND SIMETHICONE 1200; 120; 1200 MG/30ML; MG/30ML; MG/30ML
30 SUSPENSION ORAL ONCE
Status: COMPLETED | OUTPATIENT
Start: 2025-05-06 | End: 2025-05-06

## 2025-05-06 RX ORDER — ACETAMINOPHEN 325 MG/1
650 TABLET ORAL
Status: COMPLETED | OUTPATIENT
Start: 2025-05-06 | End: 2025-05-06

## 2025-05-06 RX ORDER — LIDOCAINE HYDROCHLORIDE 20 MG/ML
15 SOLUTION OROPHARYNGEAL ONCE
Status: COMPLETED | OUTPATIENT
Start: 2025-05-06 | End: 2025-05-06

## 2025-05-06 RX ADMIN — ASPIRIN 325 MG ORAL TABLET 325 MG: 325 PILL ORAL at 02:05

## 2025-05-06 RX ADMIN — IBUPROFEN 600 MG: 600 TABLET ORAL at 04:05

## 2025-05-06 RX ADMIN — ALUMINUM HYDROXIDE, MAGNESIUM HYDROXIDE, AND SIMETHICONE 30 ML: 200; 200; 20 SUSPENSION ORAL at 04:05

## 2025-05-06 RX ADMIN — LIDOCAINE HYDROCHLORIDE 15 ML: 20 SOLUTION ORAL at 04:05

## 2025-05-06 RX ADMIN — ACETAMINOPHEN 650 MG: 325 TABLET ORAL at 04:05

## 2025-05-06 NOTE — ED TRIAGE NOTES
Patient presents for chest pain for the past 3 days but it has gotten worse today and now she feels SOB. Headache on L side of head and left arm. Her left leg will sometimes feel numb and tingly but it does not right now.

## 2025-05-06 NOTE — ED PROVIDER NOTES
"Encounter Date: 2025       History     Chief Complaint   Patient presents with    Chest Pain     Left sided chest pain x a few days. States now it is making her short of breath     The history is limited by a language barrier. A  was used.     Sena Daniel is a 34 y.o. female, presenting with complaints of 3d chest pain but worsening and now with SOB. HA L sided. Douglas prior hx of HTN, takes no BP medications. HA since yesterday, improved with aleve yesterday. Did not take meds prior to arrival. Pt noticed numbness, tingling of the L side 3 days ago. Reports L calf "heaviness". Takes BC, quit smoking weed 1 year ago, no hx of blood clots/blood thinner use. Reports SOB at rest and w exertion.     On repeat exam pt now reporting dizziness and states as she has not had much to eat or drink since yesterday.    Review of patient's allergies indicates:  No Known Allergies  History reviewed. No pertinent past medical history.  Past Surgical History:   Procedure Laterality Date     SECTION      breech/tulane    GALLBLADDER SURGERY       No family history on file.  Social History[1]  Review of Systems    Physical Exam     Initial Vitals [25 0019]   BP Pulse Resp Temp SpO2   (!) 185/91 100 20 97.8 °F (36.6 °C) 100 %      MAP       --         Physical Exam    Nursing note and vitals reviewed.  Constitutional: She appears well-developed and well-nourished. She is not diaphoretic. She is active. No distress.   Eyes: EOM are normal. No scleral icterus.   Cardiovascular:  Normal rate and regular rhythm.     Exam reveals no gallop and no friction rub.       No murmur heard.  Pulmonary/Chest: No accessory muscle usage. No respiratory distress. She has no decreased breath sounds. She has no wheezes.   Abdominal: She exhibits no shifting dullness, no distension and no fluid wave. There is no abdominal tenderness. There is no rebound and no guarding.   Musculoskeletal:         General: No " tenderness or edema.      Comments: No swelling or TTP of the bilateral LE     Neurological: She is alert and oriented to person, place, and time. GCS score is 15. GCS eye subscore is 4. GCS verbal subscore is 5. GCS motor subscore is 6.   Skin: Skin is warm and dry. Capillary refill takes less than 2 seconds. No pallor.         ED Course   Procedures  Labs Reviewed   CBC WITH DIFFERENTIAL - Abnormal       Result Value    WBC 7.73      RBC 4.47      HGB 13.7      HCT 40.7      MCV 91      MCH 30.6      MCHC 33.7      RDW 11.8      Platelet Count 218      MPV 13.4 (*)     Nucleated RBC 0      Neut % 65.7      Lymph % 26.1      Mono % 6.5      Eos % 0.5      Basophil % 0.9      Imm Grans % 0.3      Neut # 5.08      Lymph # 2.02      Mono # 0.50      Eos # 0.04      Baso # 0.07      Imm Grans # 0.02     INFLUENZA A & B BY MOLECULAR - Normal    INFLUENZA A MOLECULAR Negative      INFLUENZA B MOLECULAR  Negative     COMPREHENSIVE METABOLIC PANEL - Normal    Sodium 140      Potassium 3.5      Chloride 106      CO2 23      Glucose 94      BUN 12      Creatinine 0.7      Calcium 9.3      Protein Total 7.5      Albumin 4.4      Bilirubin Total 0.5      ALP 74      AST 17      ALT 23      Anion Gap 11      eGFR >60     TROPONIN I HIGH SENSITIVITY - Normal    Troponin High Sensitive 3     TROPONIN I HIGH SENSITIVITY - Normal    Troponin High Sensitive 4     B-TYPE NATRIURETIC PEPTIDE - Normal    BNP <10     D DIMER, QUANTITATIVE - Normal    D-Dimer 0.22     SARS-COV-2 RNA AMPLIFICATION, QUAL - Normal    SARS COV-2 Molecular Negative     TROPONIN I HIGH SENSITIVITY - Normal    Troponin High Sensitive 4     CBC W/ AUTO DIFFERENTIAL    Narrative:     The following orders were created for panel order CBC auto differential.  Procedure                               Abnormality         Status                     ---------                               -----------         ------                     CBC with Differential[[id number]]        Abnormal            Final result                 Please view results for these tests on the individual orders.   POCT URINE PREGNANCY   ISTAT PROCEDURE    POC Glucose 98      POC BUN 11      POC Creatinine 0.8      POC Sodium 140      POC Potassium 3.7      POC Chloride 105      POC TCO2 (MEASURED) 24      POC Ionized Calcium 1.17      POC Hematocrit 40      Sample ELOISA     ISTAT CHEM8          Imaging Results              X-Ray Chest PA And Lateral (Final result)  Result time 05/06/25 04:16:19      Final result by Gregorio Smallwood MD (05/06/25 04:16:19)                   Impression:      Mild prominence of the cardiopericardial silhouette on the lateral view.  Correlate clinically.    Otherwise, no acute radiographic abnormality in the visualized chest.      Electronically signed by: Gregorio Smallwood  Date:    05/06/2025  Time:    04:16               Narrative:    EXAMINATION:  XR CHEST PA AND LATERAL    CLINICAL HISTORY:  Chest Pain;    TECHNIQUE:  PA and lateral views of the chest were performed.    COMPARISON:  None    FINDINGS:  Midline thoracic trachea, allowing for rightward patient rotation.    Mild prominence of the cardiopericardial silhouette on the lateral view.    No consolidation, discrete pneumothorax, pleural fluid, acute upper abdominal abnormality, or acute skeletal abnormality is demonstrated radiographically.    There are faintly visualized right upper quadrant surgical clips, compatible with prior cholecystectomy.                                       Medications   aspirin tablet 325 mg (325 mg Oral Given 5/6/25 0245)   acetaminophen tablet 650 mg (650 mg Oral Given 5/6/25 0401)   ibuprofen tablet 600 mg (600 mg Oral Given 5/6/25 0401)   aluminum-magnesium hydroxide-simethicone 200-200-20 mg/5 mL suspension 30 mL (30 mLs Oral Given 5/6/25 0448)     And   LIDOcaine viscous HCl 2% oral solution 15 mL (15 mLs Oral Given 5/6/25 0448)     Medical Decision Making  Sena Daniel  is a 34 y.o.  "female, presenting with complaints of chest pain and headache, history of present illness obtained from pt  as seen above. Patient able to provide adequate and detailed history of present illness. Patient found to be well appearing and in no acute distress , stable. Exam notable as above.     DDX includes but is not limited to: ACS, PE, Aortic dissection, Pneumothorax, PNA, GERD, Costochondritis, CHF exacerbation, Viral Syndrome.   Patient had reassuring ACS workup today.  X-ray does not show any acute pathology to explain today's presentation.  She was not have acute electrolyte abnormalities that would explain today's presentation.  She was COVID and flu negative.  Her D-dimer was negative.  She reports improvement in her previous symptoms with GI cocktail.  Patient reports feeling comfortable with discharge with supportive care today.    See ED course for additional discussion. Data Reviewed/Counseling: I have reviewed the patient's vital signs, nursing notes, and other relevant tests/information. Any incidental findings were discussed with the patient. I had a detailed discussion with the patient regarding the historical points, exam findings, and any diagnostic results supporting the diagnosis.   Disposition: Discharged     Amount and/or Complexity of Data Reviewed  Labs: ordered. Decision-making details documented in ED Course.  Radiology: ordered. Decision-making details documented in ED Course.    Risk  OTC drugs.  Prescription drug management.               ED Course as of 05/06/25 0826   Tue May 06, 2025   0025 Triage EKG reviewed: No STEMI [LP]   0133 3d chest pain but worsening and now with SOB. HA L sided. Douglas prior hx of HTN, takes no BP medications. HA since yesterday, improved with aleve. Did not take meds prior to arrival  [HB]   0135 Noticed Numbness, tingling of the L side 3 days ago. Reports L calf "heaviness".  [HB]   0137 Takes BC, quit smoking weed 1 year ago, no hx of blood clots/blood " thinner use.   Reports SOB at rest and w exertion.  [HB]   0313 ISTAT PROCEDURE  unremarkable [HB]   0313 BP(!): 185/91 [HB]   0314 BP: 100/74 [HB]   0314 X-Ray Chest PA And Lateral  Independent interpretation CXR: no concern for focal consolidation that would be concerning for pneumonia. No evidence of pleural effusion with blunted diaphragm. No pneumothorax and has lung markings out to the peripheral zones. No evidence of free air under the diaphragm.    [HB]   0314 Pulse: 100 [HB]   0314 Pulse: 75  Improved from previous tachycardia [HB]   0333 WBC: 7.73  No leukocytosis [HB]   0334 Hemoglobin: 13.7  Stable H&H [HB]   0421 D-Dimer: 0.22  Will not get CT-PE [HB]   0421 SARS COV-2 MOLECULAR: Negative [HB]   0421 BNP: <10  wnl [HB]   0421 Troponin I High Sensitivity: 3  wnl [HB]   0442 Influenza A, Molecular: Negative [HB]   0442 Influenza B, Molecular: Negative [HB]      ED Course User Index  [HB] Kisha Dey MD  [LP] Codey Shelton III, MD                           Clinical Impression:  Final diagnoses:  [R07.9] Chest pain  [R42] Dizziness          ED Disposition Condition    Discharge Stable          ED Prescriptions    None       Follow-up Information       Follow up With Specialties Details Why Contact Info    Your Primary Care Provider  Schedule an appointment as soon as possible for a visit in 1 week  Follow up with your PCP as soon as possible. If you do not have a PCP, please follow up with [facility], you may contact them to schedule an appointment .    [facility] - Emergency Dept Emergency Medicine Go to  As needed, If symptoms worsen [id number]  [facility] [id number] [phone]                 [1]   Social History  Tobacco Use    Smoking status: Former     Current packs/day: 0.00     Average packs/day: 0.1 packs/day for 4.0 years (0.4 ttl pk-yrs)     Types: Cigarettes     Start date: 2015     Quit date: 2019     Years since quittin.9    Smokeless tobacco:  Never   Substance Use Topics    Alcohol use: Yes    Drug use: No        Kisha Dey MD  Resident  05/06/25 2962

## 2025-05-06 NOTE — DISCHARGE INSTRUCTIONS
Diagnóstico: Dolor en el pecho    Plan de seguimiento:  - Seguimiento con phoenix médico de cabecera en un plazo de 3 a 5 días  - Pruebas y/o evaluaciones adicionales según las indicaciones de phoenix médico de cabecera  - No se encontraron causas de emergencia para mu síntomas de hoy. Si mu síntomas empeoran, puede regresar a urgencias si no puede claudia a phoenix médico de cabecera.    Regrese a urgencias si presenta síntomas chantelle, entre otros: empeoramiento de los síntomas, dificultad para respirar o dolor en el pecho, vómitos con dificultad para retener líquidos, fiebre superior a 38 °C, desmayos/pérdida del conocimiento u otros síntomas preocupantes.    Le agradecemos phoenix visita de hoy y esperamos haberle brindado un buen servicio a usted y a phoenix allyssa ibrahima phoenix estancia.

## 2025-05-08 ENCOUNTER — HOSPITAL ENCOUNTER (EMERGENCY)
Facility: HOSPITAL | Age: 35
Discharge: HOME | End: 2025-05-09
Attending: EMERGENCY MEDICINE

## 2025-05-08 DIAGNOSIS — F41.0 PANIC ATTACK: ICD-10-CM

## 2025-05-08 DIAGNOSIS — R07.9 CHEST PAIN: ICD-10-CM

## 2025-05-08 DIAGNOSIS — F41.9 ANXIETY: ICD-10-CM

## 2025-05-08 DIAGNOSIS — R00.0 TACHYCARDIA: Primary | ICD-10-CM

## 2025-05-08 DIAGNOSIS — R06.02 SHORTNESS OF BREATH: ICD-10-CM

## 2025-05-08 LAB
ABSOLUTE EOSINOPHIL (OHS): 0.03 K/UL
ABSOLUTE MONOCYTE (OHS): 0.66 K/UL (ref 0.3–1)
ABSOLUTE NEUTROPHIL COUNT (OHS): 5 K/UL (ref 1.8–7.7)
ALBUMIN SERPL BCP-MCNC: 4.4 G/DL (ref 3.5–5.2)
ALP SERPL-CCNC: 79 UNIT/L (ref 40–150)
ALT SERPL W/O P-5'-P-CCNC: 22 UNIT/L (ref 10–44)
ANION GAP (OHS): 13 MMOL/L (ref 8–16)
AST SERPL-CCNC: 18 UNIT/L (ref 11–45)
B-HCG UR QL: NEGATIVE
BASOPHILS # BLD AUTO: 0.07 K/UL
BASOPHILS NFR BLD AUTO: 0.9 %
BILIRUB SERPL-MCNC: 0.6 MG/DL (ref 0.1–1)
BNP SERPL-MCNC: <10 PG/ML (ref 0–99)
BUN SERPL-MCNC: 10 MG/DL (ref 6–20)
CALCIUM SERPL-MCNC: 9.6 MG/DL (ref 8.7–10.5)
CHLORIDE SERPL-SCNC: 103 MMOL/L (ref 95–110)
CO2 SERPL-SCNC: 24 MMOL/L (ref 23–29)
CREAT SERPL-MCNC: 0.7 MG/DL (ref 0.5–1.4)
CTP QC/QA: YES
D DIMER PPP IA.FEU-MCNC: <0.19 MG/L FEU
ERYTHROCYTE [DISTWIDTH] IN BLOOD BY AUTOMATED COUNT: 11.7 % (ref 11.5–14.5)
GFR SERPLBLD CREATININE-BSD FMLA CKD-EPI: >60 ML/MIN/1.73/M2
GLUCOSE SERPL-MCNC: 102 MG/DL (ref 70–110)
HCT VFR BLD AUTO: 41.1 % (ref 37–48.5)
HGB BLD-MCNC: 14.2 GM/DL (ref 12–16)
IMM GRANULOCYTES # BLD AUTO: 0.03 K/UL (ref 0–0.04)
IMM GRANULOCYTES NFR BLD AUTO: 0.4 % (ref 0–0.5)
LIPASE SERPL-CCNC: 25 U/L (ref 4–60)
LYMPHOCYTES # BLD AUTO: 2.33 K/UL (ref 1–4.8)
MCH RBC QN AUTO: 30.7 PG (ref 27–31)
MCHC RBC AUTO-ENTMCNC: 34.5 G/DL (ref 32–36)
MCV RBC AUTO: 89 FL (ref 82–98)
NUCLEATED RBC (/100WBC) (OHS): 0 /100 WBC
PLATELET # BLD AUTO: 221 K/UL (ref 150–450)
PMV BLD AUTO: 12.9 FL (ref 9.2–12.9)
POTASSIUM SERPL-SCNC: 3.6 MMOL/L (ref 3.5–5.1)
PROT SERPL-MCNC: 7.8 GM/DL (ref 6–8.4)
RBC # BLD AUTO: 4.63 M/UL (ref 4–5.4)
RELATIVE EOSINOPHIL (OHS): 0.4 %
RELATIVE LYMPHOCYTE (OHS): 28.7 % (ref 18–48)
RELATIVE MONOCYTE (OHS): 8.1 % (ref 4–15)
RELATIVE NEUTROPHIL (OHS): 61.5 % (ref 38–73)
SARS-COV-2 RDRP RESP QL NAA+PROBE: NEGATIVE
SODIUM SERPL-SCNC: 140 MMOL/L (ref 136–145)
TROPONIN I SERPL HS-MCNC: 3 NG/L
TROPONIN I SERPL HS-MCNC: <3 NG/L
TSH SERPL-ACNC: 1.12 UIU/ML (ref 0.4–4)
WBC # BLD AUTO: 8.12 K/UL (ref 3.9–12.7)

## 2025-05-08 PROCEDURE — 93010 ELECTROCARDIOGRAM REPORT: CPT | Mod: ,,, | Performed by: INTERNAL MEDICINE

## 2025-05-08 PROCEDURE — 96374 THER/PROPH/DIAG INJ IV PUSH: CPT

## 2025-05-08 PROCEDURE — 85379 FIBRIN DEGRADATION QUANT: CPT | Performed by: PHYSICIAN ASSISTANT

## 2025-05-08 PROCEDURE — 83880 ASSAY OF NATRIURETIC PEPTIDE: CPT | Performed by: PHYSICIAN ASSISTANT

## 2025-05-08 PROCEDURE — 83690 ASSAY OF LIPASE: CPT | Performed by: PHYSICIAN ASSISTANT

## 2025-05-08 PROCEDURE — 63600175 PHARM REV CODE 636 W HCPCS: Performed by: PHYSICIAN ASSISTANT

## 2025-05-08 PROCEDURE — 25000003 PHARM REV CODE 250: Performed by: PHYSICIAN ASSISTANT

## 2025-05-08 PROCEDURE — 96361 HYDRATE IV INFUSION ADD-ON: CPT

## 2025-05-08 PROCEDURE — 84443 ASSAY THYROID STIM HORMONE: CPT | Performed by: EMERGENCY MEDICINE

## 2025-05-08 PROCEDURE — 99285 EMERGENCY DEPT VISIT HI MDM: CPT | Mod: 25

## 2025-05-08 PROCEDURE — 80053 COMPREHEN METABOLIC PANEL: CPT | Performed by: PHYSICIAN ASSISTANT

## 2025-05-08 PROCEDURE — 84484 ASSAY OF TROPONIN QUANT: CPT | Performed by: PHYSICIAN ASSISTANT

## 2025-05-08 PROCEDURE — 81025 URINE PREGNANCY TEST: CPT | Performed by: PHYSICIAN ASSISTANT

## 2025-05-08 PROCEDURE — 25500020 PHARM REV CODE 255: Performed by: EMERGENCY MEDICINE

## 2025-05-08 PROCEDURE — 93005 ELECTROCARDIOGRAM TRACING: CPT

## 2025-05-08 PROCEDURE — U0002 COVID-19 LAB TEST NON-CDC: HCPCS | Performed by: PHYSICIAN ASSISTANT

## 2025-05-08 PROCEDURE — 85025 COMPLETE CBC W/AUTO DIFF WBC: CPT | Performed by: PHYSICIAN ASSISTANT

## 2025-05-08 RX ORDER — ONDANSETRON 4 MG/1
4 TABLET, ORALLY DISINTEGRATING ORAL
Status: DISCONTINUED | OUTPATIENT
Start: 2025-05-08 | End: 2025-05-08

## 2025-05-08 RX ORDER — KETOROLAC TROMETHAMINE 30 MG/ML
10 INJECTION, SOLUTION INTRAMUSCULAR; INTRAVENOUS
Status: COMPLETED | OUTPATIENT
Start: 2025-05-08 | End: 2025-05-09

## 2025-05-08 RX ORDER — ONDANSETRON HYDROCHLORIDE 2 MG/ML
4 INJECTION, SOLUTION INTRAVENOUS
Status: COMPLETED | OUTPATIENT
Start: 2025-05-08 | End: 2025-05-08

## 2025-05-08 RX ORDER — LORAZEPAM 1 MG/1
1 TABLET ORAL
Status: COMPLETED | OUTPATIENT
Start: 2025-05-08 | End: 2025-05-08

## 2025-05-08 RX ADMIN — IOHEXOL 75 ML: 350 INJECTION, SOLUTION INTRAVENOUS at 11:05

## 2025-05-08 RX ADMIN — ONDANSETRON 4 MG: 2 INJECTION INTRAMUSCULAR; INTRAVENOUS at 08:05

## 2025-05-08 RX ADMIN — SODIUM CHLORIDE 1000 ML: 9 INJECTION, SOLUTION INTRAVENOUS at 10:05

## 2025-05-08 RX ADMIN — LORAZEPAM 1 MG: 1 TABLET ORAL at 10:05

## 2025-05-09 VITALS
WEIGHT: 120 LBS | TEMPERATURE: 98 F | DIASTOLIC BLOOD PRESSURE: 70 MMHG | HEIGHT: 61 IN | SYSTOLIC BLOOD PRESSURE: 114 MMHG | BODY MASS INDEX: 22.66 KG/M2 | RESPIRATION RATE: 18 BRPM | HEART RATE: 81 BPM | OXYGEN SATURATION: 100 %

## 2025-05-09 LAB
OHS QRS DURATION: 78 MS
OHS QTC CALCULATION: 450 MS

## 2025-05-09 PROCEDURE — 96375 TX/PRO/DX INJ NEW DRUG ADDON: CPT

## 2025-05-09 PROCEDURE — 63600175 PHARM REV CODE 636 W HCPCS: Mod: JZ,TB | Performed by: PHYSICIAN ASSISTANT

## 2025-05-09 RX ORDER — LORAZEPAM 1 MG/1
1 TABLET ORAL
Qty: 5 TABLET | Refills: 0 | Status: SHIPPED | OUTPATIENT
Start: 2025-05-09

## 2025-05-09 RX ADMIN — KETOROLAC TROMETHAMINE 10 MG: 30 INJECTION, SOLUTION INTRAMUSCULAR; INTRAVENOUS at 01:05

## 2025-05-09 NOTE — ED PROVIDER NOTES
Encounter Date: 2025       History     Chief Complaint   Patient presents with    Chest Pain    Shortness of Breath     Chest pain and SOB since Tuesday, seen here for same complaint then. PCP tomorrow but can't wait due to worsening pain and SOB.     34-year-old female with no pertinent PMHx presents to emergency department with shortness of breath and chest pain x2 days.  She describes chest pressure and heaviness mainly over the left side of her chest. Her symptoms are constant.  She was evaluated for this complaint 2 days ago and had a negative chest pain workup.  History of bronchitis as a child however no symptoms as an adult.  She does not have a cough or fever.  She has denies recent surgeries, immobilization, clotting disorder. She is on birth control.  She does not smoke.  No personal or family history of cardiac disease.  She has denies fever, URI symptoms, palpitations, abdominal pain, lower extremity swelling.    The history is provided by the patient. The history is limited by a language barrier. A  was used.     Review of patient's allergies indicates:  No Known Allergies  History reviewed. No pertinent past medical history.  Past Surgical History:   Procedure Laterality Date     SECTION      breech/tulane    GALLBLADDER SURGERY       No family history on file.  Social History[1]  Review of Systems    Physical Exam     Initial Vitals [25 1828]   BP Pulse Resp Temp SpO2   (!) 164/79 105 18 97.6 °F (36.4 °C) 99 %      MAP       --         Physical Exam    Nursing note and vitals reviewed.  Constitutional: She appears well-developed and well-nourished. She is not diaphoretic. No distress.   HENT:   Head: Normocephalic and atraumatic.   Nose: Nose normal.   Eyes: Conjunctivae and EOM are normal.   Neck: Neck supple.   Cardiovascular:  Normal rate, regular rhythm, normal heart sounds and intact distal pulses.           Pulmonary/Chest: Breath sounds normal. No  respiratory distress.   Good air movement.  She speaks in full and complete sentences.   Musculoskeletal:      Cervical back: Neck supple.     Neurological: She is alert and oriented to person, place, and time. Gait normal.   Skin: No rash noted.   Psychiatric: She has a normal mood and affect. Thought content normal.         ED Course   Procedures  Labs Reviewed   COMPREHENSIVE METABOLIC PANEL - Normal       Result Value    Sodium 140      Potassium 3.6      Chloride 103      CO2 24      Glucose 102      BUN 10      Creatinine 0.7      Calcium 9.6      Protein Total 7.8      Albumin 4.4      Bilirubin Total 0.6      ALP 79      AST 18      ALT 22      Anion Gap 13      eGFR >60     TROPONIN I HIGH SENSITIVITY - Normal    Troponin High Sensitive 3     TROPONIN I HIGH SENSITIVITY - Normal    Troponin High Sensitive <3     B-TYPE NATRIURETIC PEPTIDE - Normal    BNP <10     D DIMER, QUANTITATIVE - Normal    D-Dimer <0.19     CBC WITH DIFFERENTIAL - Normal    WBC 8.12      RBC 4.63      HGB 14.2      HCT 41.1      MCV 89      MCH 30.7      MCHC 34.5      RDW 11.7      Platelet Count 221      MPV 12.9      Nucleated RBC 0      Neut % 61.5      Lymph % 28.7      Mono % 8.1      Eos % 0.4      Basophil % 0.9      Imm Grans % 0.4      Neut # 5.00      Lymph # 2.33      Mono # 0.66      Eos # 0.03      Baso # 0.07      Imm Grans # 0.03     SARS-COV-2 RNA AMPLIFICATION, QUAL - Normal    SARS COV-2 Molecular Negative     LIPASE - Normal    Lipase Level 25     CBC W/ AUTO DIFFERENTIAL    Narrative:     The following orders were created for panel order CBC auto differential.  Procedure                               Abnormality         Status                     ---------                               -----------         ------                     CBC with Differential[[id number]]       Normal              Final result                 Please view results for these tests on the individual orders.   TSH   POCT URINE PREGNANCY      EKG Readings: (Independently Interpreted)   Initial Reading: No STEMI. Rhythm: Sinus Tachycardia. Heart Rate: 108. Axis: Normal. Clinical Impression: Sinus Tachycardia       Imaging Results              X-Ray Chest AP Portable (Final result)  Result time 05/08/25 20:52:02      Final result by Tato Miranda MD (05/08/25 20:52:02)                   Impression:      1. No acute cardiopulmonary process.      Electronically signed by: Tato Miranda MD  Date:    05/08/2025  Time:    20:52               Narrative:    EXAMINATION:  XR CHEST AP PORTABLE    CLINICAL HISTORY:  Chest Pain;    TECHNIQUE:  Single frontal view of the chest was performed.    COMPARISON:  05/06/2025    FINDINGS:  The cardiomediastinal silhouette is not enlarged.  There is no pleural effusion.  The trachea is midline.  The lungs are symmetrically expanded bilaterally without evidence of acute parenchymal process. No large focal consolidation seen.  There is no pneumothorax.  The osseous structures are unremarkable.                                       Medications   ketorolac injection 9.999 mg (has no administration in time range)   sodium chloride 0.9% bolus 1,000 mL 1,000 mL (1,000 mLs Intravenous New Bag 5/8/25 2208)   ondansetron injection 4 mg (4 mg Intravenous Given 5/8/25 2040)   LORazepam tablet 1 mg (1 mg Oral Given 5/8/25 2206)     Medical Decision Making  34-year-old female with no pertinent PMHx presents to emergency department with shortness of breath and chest pain x2 days. Nontoxic appearing.  Vitals with mild tachycardia and hypertension. Afebrile. Exam as above. I will initiate workup and reassess.    Ddx:  Arrhythmia, pleural effusion, pneumothorax, PE, ACS, anxiety    Workup today is reassuring.  CBC and CMP are unremarkable.  She has a negative dimer.  Troponin BNP are within normal limits.  EKG with mild tachycardia however without arrhythmia.    Patient ambulated and remains tachycardic in the 110-118 range.  She  felt lightheaded and felt like she was going to pass out.  Workup broadened.  Pending TSH and CTA to rule out PE at this time.  Fluids ordered for tachycardia. I will sign out patient to oncoming ED provider to due to shift change.  Patient is also tearful and anxious appearing.  Nursing staff reports patient is hyperventilating after presyncopal episode.  Ativan ordered.    At this time I will sign out to ED provider due to shift change. Pending TSH, CTA and clinical improvement at the time of sign out.    Amount and/or Complexity of Data Reviewed  Labs: ordered. Decision-making details documented in ED Course.  Radiology: ordered.    Risk  Prescription drug management.      Additional MDM:   PERC Rule:   Age is greater than or equal to 50 = 0.0  Heart Rate is greater than or equal to 100 = 1.0  SaO2 on room air < 95% = 0.0  Unilateral leg swelling = 0.0  Hemoptysis = 0.0  Recent surgery or trauma = 0.0  Prior PE or DVT =  0.0  Hormone use = 1.0  PERC Score = 2              ED Course as of 05/08/25 2230   Thu May 08, 2025   2023 CBC auto differential  Unremarkable CBC.  Labs without leukocytosis.  H&H is stable [HM]   2024 Comprehensive metabolic panel  Unremarkable CMP [HM]   2024 D dimer, quantitative  Negative dimer []   2024 Troponin I High Sensitivity: <3  Negative troponin and BNP []   2157 Patient ambulated and pulse of approximately 110-118.  She became lightheaded and presyncopal episode.  Patient is tearful and hyperventilating.  While dimer is negative, I obtain a CTA for further evaluation of chest pain and shortness of breath.  I will also give Ativan and fluids.  TSH order for further assessment of tachycardia [HM]      ED Course User Index  [HM] Kisha Rodriguez PA-C                           Clinical Impression:  Final diagnoses:  [R07.9] Chest pain  [R00.0] Tachycardia (Primary)  [R06.02] Shortness of breath          ED Disposition Condition    Discharge Stable          ED Prescriptions     None       Follow-up Information       Follow up With Specialties Details Why Contact Info    [facility] - Emergency Dept Emergency Medicine  If symptoms worsen [address]  [facility] [address] [phone]    Katarina Linares, FNP Family Medicine In 1 day  [address] [facility] [address]  [phone]                 [1]   Social History  Tobacco Use    Smoking status: Former     Current packs/day: 0.00     Average packs/day: 0.1 packs/day for 4.0 years (0.4 ttl pk-yrs)     Types: Cigarettes     Start date: 2015     Quit date: 2019     Years since quittin.9    Smokeless tobacco: Never   Substance Use Topics    Alcohol use: Yes    Drug use: No        Kisha Rodriguez PA-C  25 2232

## 2025-05-09 NOTE — DISCHARGE INSTRUCTIONS
Phoenix evaluación de hoy es tranquilizadora. Holger un seguimiento con phoenix atención primaria para el dolor en el pecho y la dificultad para respirar    Rosser 600 mg de ibuprofeno cada 8 horas ibrahima 7-10 días hasta que phoenix médico de atención primaria pueda volver a evaluarlo.     Take 1 tablet of lorazepam as needed for panic attack. I am only giving you 5 tablets because these are VERY addicting. Use sparingly until you can be seen by psychiatry.     Se maria luisa precauciones estrictas en la grady de emergencias para regresar de inmediato en fredi de síntomas nuevos, que empeoran o son preocupantes

## 2025-05-09 NOTE — ED TRIAGE NOTES
Pt states that she is having pressure in her chest and is having trouble to take deep breath. Pt states that she was seen 2 nights ago for same issue and due to see PCP tomorrow.

## 2025-05-09 NOTE — PROVIDER PROGRESS NOTES - EMERGENCY DEPT.
Encounter Date: 5/8/2025    ED Physician Progress Notes        ED Physician Hand-off Note:    ED Course: I assumed care of patient from off-going ED physician team. Briefly, Patient is a 34-year-old female who presents with chest pain and shortness of the breath that started yesterday.  This is her 2nd presentation to the emergency department in 2 days.  She is very anxious appearing and tachycardic.  She was given 1 mg lorazepam and Toradol and feels significantly better. She is very concerned about having more of these panic attacks.    At the time of signout plan was pending CTA to rule out PE, thyroid studies given concern for hyperthyroidism, fluids, and re-evaluation. Her CT today showed no signs of PE. Her TSH was within normal limits. All other laboratory findings within normal limits. See ED course.    Medications given in the ED:    Medications   ketorolac injection 9.999 mg (9.999 mg Intravenous Given 5/9/25 0136)   ondansetron injection 4 mg (4 mg Intravenous Given 5/8/25 2040)   LORazepam tablet 1 mg (1 mg Oral Given 5/8/25 2206)   sodium chloride 0.9% bolus 1,000 mL 1,000 mL (0 mLs Intravenous Stopped 5/8/25 2256)   iohexoL (OMNIPAQUE 350) injection 75 mL (75 mLs Intravenous Given 5/8/25 2301)       Imaging Results              CTA Chest Non-Coronary (PE Studies) (Final result)  Result time 05/09/25 01:05:34      Final result by Luis Francis MD (05/09/25 01:05:34)                   Impression:      Suboptimal evaluation of pulmonary arteries due to contrast bolus timing.  No noting thromboembolism to the level of the distal main pulmonary arteries.  No CT evidence of right heart strain.    No focal consolidation.    Electronically signed by resident: Anant Wolfe  Date:    05/09/2025  Time:    00:18    Electronically signed by: Luis Francis  Date:    05/09/2025  Time:    01:05               Narrative:    EXAMINATION:  CTA CHEST NON CORONARY (PE STUDIES)    CLINICAL HISTORY:  Pulmonary  embolism (PE) suspected, neg D-dimer    TECHNIQUE:  CTA images of the chest were obtained after the administration of 75 mL of intravenous Omnipaque contrast with timing optimized for the assessment of the pulmonary arteries.    COMPARISON:  Chest radiograph same day    FINDINGS:  Base of Neck/thoracic soft tissues: No significant abnormality.    Aorta: Left-sided aortic arch. Nonaneurysmal.  No atherosclerosis of the thoracic aorta.  Direct origin of the left vertebral artery from the aortic arch.    Heart/pericardium: Normal size.  No atherosclerosis of the coronary arteries.  No pericardial effusion.    Pulmonary arteries: Main pulmonary artery is normal in size.  Suboptimal evaluation of the pulmonary arteries due to streak artifact and contrast bolus timing.  No convincing thromboembolism to the level of the distal main pulmonary arteries.    Pulmonary veins: No significant abnormality.    Jayne/Mediastinum: No pathologic lymph node enlargement.  Increased attenuation in the anterior mediastinum, likely thymic tissue.    Esophagus: No significant abnormality.    Upper Abdomen: Cholecystectomy.  No significant abnormality of the upper abdomen.    Bones:.  No suspicious lytic or sclerotic lesion.    Airways/Lungs/Pleura: Central airways are patent.  4 mm luis a fissural triangular opacity in the left lower lobe, likely intraparenchymal lymph node (3-206).  No focal consolidation, pleural effusion or pneumothorax.                        Preliminary result by Anant Wolfe MD (05/09/25 00:29:31)                   Impression:      Suboptimal evaluation of pulmonary arteries due to contrast bolus timing.  No noting thromboembolism to the level of the distal main pulmonary arteries.  No CT evidence of right heart strain.    No focal consolidation.    Electronically signed by resident: Anant Wolfe  Date:    05/09/2025  Time:    00:18                 Narrative:    EXAMINATION:  CTA CHEST NON CORONARY (PE  STUDIES)    CLINICAL HISTORY:  Pulmonary embolism (PE) suspected, neg D-dimer;    TECHNIQUE:  CTA images of the chest were obtained after the administration of 75 mL of intravenous Omnipaque contrast with timing optimized for the assessment of the pulmonary arteries.    COMPARISON:  Chest radiograph same day    FINDINGS:  Base of Neck/thoracic soft tissues: No significant abnormality.    Aorta: Left-sided aortic arch. Nonaneurysmal.  No atherosclerosis of the thoracic aorta.  Direct origin of the left vertebral artery from the aortic arch.    Heart/pericardium: Normal size.  No atherosclerosis of the coronary arteries.  No pericardial effusion.    Pulmonary arteries: Main pulmonary artery is normal in size.  Suboptimal evaluation of the pulmonary arteries due to streak artifact and contrast bolus timing.  No convincing thromboembolism to the level of the distal main pulmonary arteries.    Pulmonary veins: No significant abnormality.    Jayne/Mediastinum: No pathologic lymph node enlargement.  Increased attenuation in the anterior mediastinum, likely thymic tissue.    Esophagus: No significant abnormality.    Upper Abdomen: Cholecystectomy.  No significant abnormality of the upper abdomen.    Bones:.  No suspicious lytic or sclerotic lesion.    Airways/Lungs/Pleura: Central airways are patent.  4 mm luis a fissural triangular opacity in the left lower lobe, likely intraparenchymal lymph node (3-206).  No focal consolidation, pleural effusion or pneumothorax.                                       X-Ray Chest AP Portable (Final result)  Result time 05/08/25 20:52:02      Final result by Tato Miranda MD (05/08/25 20:52:02)                   Impression:      1. No acute cardiopulmonary process.      Electronically signed by: Tato Miranda MD  Date:    05/08/2025  Time:    20:52               Narrative:    EXAMINATION:  XR CHEST AP PORTABLE    CLINICAL HISTORY:  Chest Pain;    TECHNIQUE:  Single frontal view of the  chest was performed.    COMPARISON:  05/06/2025    FINDINGS:  The cardiomediastinal silhouette is not enlarged.  There is no pleural effusion.  The trachea is midline.  The lungs are symmetrically expanded bilaterally without evidence of acute parenchymal process. No large focal consolidation seen.  There is no pneumothorax.  The osseous structures are unremarkable.                                      Disposition:   Patient was discharged home with instruction to follow up with her primary care provider to discuss today's visit and for further management of her anxiety. Referral placed to psychiatry as well. She was given 5 tablets of Lorazepam as she was incredibly fearful of having an additional panic attack and needing to return to the ED prior to being seen by psych or her PCP. Patient was counseled on addiction risks associated with this medication and instructed to use sparingly. Patient vocalizes understanding. A  was used throughout the visit to ensure understanding.    Patient comfortable with plan. Patient counseled regarding exam, results, diagnosis, treatment, and plan.    Impression: stable    Final diagnoses:  [R07.9] Chest pain  [R00.0] Tachycardia (Primary)  [R06.02] Shortness of breath  [F41.9] Anxiety  [F41.0] Panic attack